# Patient Record
Sex: MALE | Race: BLACK OR AFRICAN AMERICAN | NOT HISPANIC OR LATINO | URBAN - METROPOLITAN AREA
[De-identification: names, ages, dates, MRNs, and addresses within clinical notes are randomized per-mention and may not be internally consistent; named-entity substitution may affect disease eponyms.]

---

## 2019-07-19 ENCOUNTER — EMERGENCY (EMERGENCY)
Facility: HOSPITAL | Age: 19
LOS: 1 days | Discharge: HOME | End: 2019-07-19
Attending: EMERGENCY MEDICINE
Payer: COMMERCIAL

## 2019-07-19 VITALS
WEIGHT: 220.02 LBS | OXYGEN SATURATION: 100 % | HEIGHT: 67 IN | TEMPERATURE: 98 F | DIASTOLIC BLOOD PRESSURE: 78 MMHG | RESPIRATION RATE: 16 BRPM | HEART RATE: 66 BPM | SYSTOLIC BLOOD PRESSURE: 119 MMHG

## 2019-07-19 VITALS
RESPIRATION RATE: 16 BRPM | DIASTOLIC BLOOD PRESSURE: 70 MMHG | TEMPERATURE: 99 F | OXYGEN SATURATION: 100 % | HEART RATE: 70 BPM | SYSTOLIC BLOOD PRESSURE: 120 MMHG

## 2019-07-19 PROCEDURE — 99283 EMERGENCY DEPT VISIT LOW MDM: CPT

## 2019-07-19 PROCEDURE — 99053 MED SERV 10PM-8AM 24 HR FAC: CPT

## 2019-07-19 RX ORDER — IBUPROFEN 200 MG
600 TABLET ORAL ONCE
Refills: 0 | Status: COMPLETED | OUTPATIENT
Start: 2019-07-19 | End: 2019-07-19

## 2019-07-19 RX ORDER — DIAZEPAM 5 MG
5 TABLET ORAL ONCE
Refills: 0 | Status: DISCONTINUED | OUTPATIENT
Start: 2019-07-19 | End: 2019-07-19

## 2019-07-19 RX ADMIN — Medication 600 MILLIGRAM(S): at 02:13

## 2019-07-19 RX ADMIN — Medication 5 MILLIGRAM(S): at 02:14

## 2019-07-19 RX ADMIN — Medication 600 MILLIGRAM(S): at 03:38
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
## 2019-07-19 NOTE — ED PROVIDER NOTE - PHYSICAL EXAMINATION
MSK: no midline or paraspinal tenderness to palpation  positive tenderness to palpation over anterior lateral neck, no edema/hematoma/abrasions  RUE strength 5/5 with grossly nml sensation  pt ambulatory with nml gait in ED

## 2020-07-25 NOTE — ED PROVIDER NOTE - RESPIRATORY, MLM
Physician Discharge Summary     Patient ID:  Efrain Persaud  [id number]  55 year old  1964    Admit date: 7/22/2020    Discharge date and time:  7/25/2020    Admitting Physician:  Josi Copeland MD     Discharge Physician:  Ann-Marie Barbosa MD     Admission Diagnoses: Pneumonia [J18.9]  Pneumonia of left lower lobe due to infectious organism [J18.9]    Discharge Diagnoses:   Left lung pneumonia - community acquired  Recent dx acute pyelonephritis/UTI - acute cystitis present on admission, being treated  Fever - resolved  Sepsis criteria met on admission, NOT severe sepsis per flowsheet tracker  Essential HTN  DM type II on insulin  Obstructive sleep apnea - on CPAP  CAD, history stents  Morbid obesity (BMI 41)  Hypokalemia  - improved  Hyponatremia, present on admission (resolved)  Lactic acidosis, present on admission - resolved    Admission Condition: fair    Discharged Condition: stable    Indication for Admission:  55-year-old male who presented to the ED with shortness of breath, chest tightness and weakness.  He has medical history pertinent for diabetes mellitus, hypertension, hyperlipidemia, and obesity with BMI 40.7.  He was seen in the ED yesterday 7/21 with 1 day of diarrhea, dizziness, and weakness.  He did not have any respiratory symptoms at that time. He had lower abdominal tenderness and was diagnosed with acute cystitis/pyelonephritis.  He was given IV fluids and sent home with Keflex.  Urine culture is now showing gram-negative rods.  COVID-19 swab was negative.  Symptoms continued and now associated with nonradiating substernal chest pressure, shortness of breath, and weakness.  Symptoms are nonexertional.  Deep inspiration worsens chest pressure.  He also reported mild dizziness, mild headache, nausea, and vomiting which has since resolved. He has not identified any other aggravating or alleviating factors.  He took Keflex but has not noticed a change in symptoms.  He came to the ED for  further evaluation.  While in the ED he was febrile and had a new left lower lobe infiltrate on chest x-ray.  Blood cultures were obtained.  COVID-19 swab was repeated.  He was transferred here due to bed capacity issues at [facility] and was admitted for further management.    Hospital Course:  He was admitted for IV Abx and IVF hydration. COVID test was negative. He has not needed oxygen. He continued to have intermittent fever over the first 24 hours, but smaller numbers, and now has had no fever in the past day. His urine culture is positive for citrobacter, blood cultures have remained negative. Abx are adjusted, he will be discharged on 2 oral abx to cover pulmonary and urinary infections.    Labs and vitals are good. electolyte abnormalities have resolved. Appetite has improved. He is ambulatory in the room and hallway. The dizziness has improved. No further isolation is needed.    Patient stable to discharge with close follow up     Consults: none    Significant Diagnostic Studies:     Lab Results   Component Value Date    SODIUM 140 07/25/2020    POTASSIUM 3.6 07/25/2020    CHLORIDE 104 07/25/2020    CO2 26 07/25/2020    GLUCOSE 138 (H) 07/25/2020    BUN 10 07/25/2020    CREATININE 0.70 07/25/2020    CALCIUM 9.1 07/25/2020    ALBUMIN 3.4 (L) 07/21/2020    MG 1.9 07/24/2020    BILIRUBIN 0.7 07/21/2020    ALKPT 80 07/21/2020    LACTA 1.2 07/22/2020    AST 29 07/21/2020    GPT 33 07/21/2020    LIPA 203 07/21/2020    INR 1.0 12/23/2019     Lab Results   Component Value Date    PTT 25 12/23/2019    WBC 5.4 07/24/2020    HGB 12.2 (L) 07/24/2020    HCT 36.6 (L) 07/24/2020     07/24/2020    RESR 14 11/07/2016    MMB 1.0 12/16/2013    MYOGLOBIN 48 04/07/2013    RAPDTR <0.02 12/23/2019    CPK 64 12/18/2013    CRP <0.3 11/07/2016    TSH 1.273 12/24/2019     Recent Labs   Lab 07/22/20  1703   NTPROB 150*       Urinalysis    Lab Results   Component Value Date    USPG >1.030 (H) 07/21/2020    UPROT Negative 07/21/2020     UWBC Small (A) 07/21/2020    URBC Negative 07/21/2020    UBILI Negative 07/21/2020    UPH 5.0 07/21/2020    UROB 0.2 07/21/2020    UBACTR NONE SEEN 01/20/2020     Lactic acid  2.6 - 1.2    Iron Panel    Lab Results   Component Value Date    IRON 52 (L) 03/27/2016    PST 20 03/27/2016     Cultures: Procalc  0.26             COVID negative 7/21, negative 7/22             Urine 7/21 - citrobacter koseri             Blood 7/22 - NGTD             Urine legionella/strep negative     Chest X-Ray:  Left retrocardiac opacity silhouetting out the left hemidiaphragm.  CT Abd/Pel 7/21 - 1. Small region of focal cortical hypoenhancement in the lateral lower pole of the left kidney, with mild adjacent perinephric inflammation. Findings raise suspicion for pyelonephritis. Alternatively, renal contusion could have similar appearance. Clinically correlate.  2. Hepatic steatosis.  3. Small bilateral adrenal myelolipomas.    Treatments: IV hydration, antibiotics: Zosyn, ceftriaxone and azithromycin, analgesia: acetaminophen, cardiac meds: metoprolol, furosemide and doxazosin, hydrochlorothiazide, isosorbide, losartan, anticoagulation: ASA, Plavix and LMW heparin and insulin: regular and Lantus    Discharge Exam:  Visit Vitals  BP (!) 154/72 (BP Location: LUE - Left upper extremity, Patient Position: Semi-Scott's)   Pulse 73   Temp 96.6 °F (35.9 °C) (Oral)   Resp 20   Ht 5' 8\" (1.727 m)   Wt 123 kg   SpO2 97%   BMI 41.23 kg/m²     Heart: Regular rate and rhythm  Lungs: Clear to auscultation bilaterally, better air movement to bases. No wheezing/rales  Abdomen: Soft, non-tender; bowel sounds normal; no masses, no hepatosplenomegaly  Skin: warm and dry, good color, no overt rashes or open areas  Neurologic: Grossly normal and Alert and oriented to person, place and time  Extremities: extremities normal, atraumatic, no cyanosis or edema and pulses are good    Disposition: Home    Patient Instructions:   Activity: activity as  tolerated  Diet: regular diet and encourage fluids  Wound Care: none needed    Follow-up with PCP in 1 week       Breath sounds clear and equal bilaterally.

## 2021-12-07 ENCOUNTER — INPATIENT (INPATIENT)
Facility: HOSPITAL | Age: 21
LOS: 3 days | Discharge: HOME | DRG: 871 | End: 2021-12-11
Attending: INTERNAL MEDICINE | Admitting: INTERNAL MEDICINE
Payer: COMMERCIAL

## 2021-12-07 VITALS
HEART RATE: 110 BPM | WEIGHT: 214.95 LBS | OXYGEN SATURATION: 90 % | TEMPERATURE: 103 F | HEIGHT: 67 IN | SYSTOLIC BLOOD PRESSURE: 124 MMHG | DIASTOLIC BLOOD PRESSURE: 80 MMHG | RESPIRATION RATE: 20 BRPM

## 2021-12-07 DIAGNOSIS — U07.1 COVID-19: ICD-10-CM

## 2021-12-07 DIAGNOSIS — Z29.9 ENCOUNTER FOR PROPHYLACTIC MEASURES, UNSPECIFIED: ICD-10-CM

## 2021-12-07 DIAGNOSIS — M62.82 RHABDOMYOLYSIS: ICD-10-CM

## 2021-12-07 DIAGNOSIS — R74.01 ELEVATION OF LEVELS OF LIVER TRANSAMINASE LEVELS: ICD-10-CM

## 2021-12-07 DIAGNOSIS — J96.01 ACUTE RESPIRATORY FAILURE WITH HYPOXIA: ICD-10-CM

## 2021-12-07 PROBLEM — Z78.9 OTHER SPECIFIED HEALTH STATUS: Chronic | Status: ACTIVE | Noted: 2019-07-19

## 2021-12-07 LAB
ALBUMIN SERPL ELPH-MCNC: 2.9 G/DL — LOW (ref 3.5–5)
ALP SERPL-CCNC: 43 U/L — SIGNIFICANT CHANGE UP (ref 40–120)
ALT FLD-CCNC: 302 U/L DA — HIGH (ref 10–60)
ANION GAP SERPL CALC-SCNC: 6 MMOL/L — SIGNIFICANT CHANGE UP (ref 5–17)
ANISOCYTOSIS BLD QL: SLIGHT — SIGNIFICANT CHANGE UP
AST SERPL-CCNC: 492 U/L — HIGH (ref 10–40)
BASOPHILS # BLD AUTO: 0 K/UL — SIGNIFICANT CHANGE UP (ref 0–0.2)
BASOPHILS NFR BLD AUTO: 0 % — SIGNIFICANT CHANGE UP (ref 0–2)
BILIRUB SERPL-MCNC: 1.1 MG/DL — SIGNIFICANT CHANGE UP (ref 0.2–1.2)
BUN SERPL-MCNC: 12 MG/DL — SIGNIFICANT CHANGE UP (ref 7–18)
CALCIUM SERPL-MCNC: 8.3 MG/DL — LOW (ref 8.4–10.5)
CHLORIDE SERPL-SCNC: 97 MMOL/L — SIGNIFICANT CHANGE UP (ref 96–108)
CK SERPL-CCNC: CRITICAL HIGH U/L (ref 35–232)
CO2 SERPL-SCNC: 30 MMOL/L — SIGNIFICANT CHANGE UP (ref 22–31)
CREAT SERPL-MCNC: 1.2 MG/DL — SIGNIFICANT CHANGE UP (ref 0.5–1.3)
CRP SERPL-MCNC: 104 MG/L — HIGH
D DIMER BLD IA.RAPID-MCNC: 998 NG/ML DDU — HIGH
EOSINOPHIL # BLD AUTO: 0 K/UL — SIGNIFICANT CHANGE UP (ref 0–0.5)
EOSINOPHIL NFR BLD AUTO: 0 % — SIGNIFICANT CHANGE UP (ref 0–6)
FERRITIN SERPL-MCNC: 6253 NG/ML — HIGH (ref 30–400)
GLUCOSE SERPL-MCNC: 97 MG/DL — SIGNIFICANT CHANGE UP (ref 70–99)
HCT VFR BLD CALC: 41.4 % — SIGNIFICANT CHANGE UP (ref 39–50)
HGB BLD-MCNC: 13.7 G/DL — SIGNIFICANT CHANGE UP (ref 13–17)
INR BLD: 1.2 RATIO — HIGH (ref 0.88–1.16)
LACTATE SERPL-SCNC: 1.1 MMOL/L — SIGNIFICANT CHANGE UP (ref 0.7–2)
LYMPHOCYTES # BLD AUTO: 0.63 K/UL — LOW (ref 1–3.3)
LYMPHOCYTES # BLD AUTO: 17 % — SIGNIFICANT CHANGE UP (ref 13–44)
MANUAL SMEAR VERIFICATION: SIGNIFICANT CHANGE UP
MCHC RBC-ENTMCNC: 23.5 PG — LOW (ref 27–34)
MCHC RBC-ENTMCNC: 33.1 GM/DL — SIGNIFICANT CHANGE UP (ref 32–36)
MCV RBC AUTO: 70.9 FL — LOW (ref 80–100)
MONOCYTES # BLD AUTO: 0.11 K/UL — SIGNIFICANT CHANGE UP (ref 0–0.9)
MONOCYTES NFR BLD AUTO: 3 % — SIGNIFICANT CHANGE UP (ref 2–14)
NEUTROPHILS # BLD AUTO: 2.98 K/UL — SIGNIFICANT CHANGE UP (ref 1.8–7.4)
NEUTROPHILS NFR BLD AUTO: 80 % — HIGH (ref 43–77)
NRBC # BLD: 0 /100 — SIGNIFICANT CHANGE UP (ref 0–0)
PLAT MORPH BLD: NORMAL — SIGNIFICANT CHANGE UP
PLATELET # BLD AUTO: 233 K/UL — SIGNIFICANT CHANGE UP (ref 150–400)
POIKILOCYTOSIS BLD QL AUTO: SLIGHT — SIGNIFICANT CHANGE UP
POLYCHROMASIA BLD QL SMEAR: SLIGHT — SIGNIFICANT CHANGE UP
POTASSIUM SERPL-MCNC: 3.5 MMOL/L — SIGNIFICANT CHANGE UP (ref 3.5–5.3)
POTASSIUM SERPL-SCNC: 3.5 MMOL/L — SIGNIFICANT CHANGE UP (ref 3.5–5.3)
PROCALCITONIN SERPL-MCNC: 0.41 NG/ML — HIGH (ref 0.02–0.1)
PROT SERPL-MCNC: 7.2 G/DL — SIGNIFICANT CHANGE UP (ref 6–8.3)
PROTHROM AB SERPL-ACNC: 14.2 SEC — HIGH (ref 10.6–13.6)
RBC # BLD: 5.84 M/UL — HIGH (ref 4.2–5.8)
RBC # FLD: 13.2 % — SIGNIFICANT CHANGE UP (ref 10.3–14.5)
RBC BLD AUTO: ABNORMAL
SARS-COV-2 RNA SPEC QL NAA+PROBE: DETECTED
SODIUM SERPL-SCNC: 133 MMOL/L — LOW (ref 135–145)
TROPONIN I, HIGH SENSITIVITY RESULT: 41.2 NG/L — SIGNIFICANT CHANGE UP
WBC # BLD: 3.73 K/UL — LOW (ref 3.8–10.5)
WBC # FLD AUTO: 3.73 K/UL — LOW (ref 3.8–10.5)

## 2021-12-07 PROCEDURE — 93010 ELECTROCARDIOGRAM REPORT: CPT

## 2021-12-07 PROCEDURE — 99291 CRITICAL CARE FIRST HOUR: CPT

## 2021-12-07 PROCEDURE — 71045 X-RAY EXAM CHEST 1 VIEW: CPT | Mod: 26

## 2021-12-07 RX ORDER — REMDESIVIR 5 MG/ML
200 INJECTION INTRAVENOUS EVERY 24 HOURS
Refills: 0 | Status: COMPLETED | OUTPATIENT
Start: 2021-12-07 | End: 2021-12-07

## 2021-12-07 RX ORDER — ACETAMINOPHEN 500 MG
325 TABLET ORAL EVERY 4 HOURS
Refills: 0 | Status: DISCONTINUED | OUTPATIENT
Start: 2021-12-07 | End: 2021-12-11

## 2021-12-07 RX ORDER — DEXAMETHASONE 0.5 MG/5ML
10 ELIXIR ORAL ONCE
Refills: 0 | Status: DISCONTINUED | OUTPATIENT
Start: 2021-12-07 | End: 2021-12-07

## 2021-12-07 RX ORDER — PANTOPRAZOLE SODIUM 20 MG/1
40 TABLET, DELAYED RELEASE ORAL
Refills: 0 | Status: DISCONTINUED | OUTPATIENT
Start: 2021-12-07 | End: 2021-12-08

## 2021-12-07 RX ORDER — DEXAMETHASONE 0.5 MG/5ML
6 ELIXIR ORAL DAILY
Refills: 0 | Status: DISCONTINUED | OUTPATIENT
Start: 2021-12-07 | End: 2021-12-11

## 2021-12-07 RX ORDER — ONDANSETRON 8 MG/1
4 TABLET, FILM COATED ORAL ONCE
Refills: 0 | Status: COMPLETED | OUTPATIENT
Start: 2021-12-07 | End: 2021-12-07

## 2021-12-07 RX ORDER — SODIUM CHLORIDE 9 MG/ML
1000 INJECTION, SOLUTION INTRAVENOUS
Refills: 0 | Status: DISCONTINUED | OUTPATIENT
Start: 2021-12-07 | End: 2021-12-10

## 2021-12-07 RX ORDER — DEXAMETHASONE 0.5 MG/5ML
10 ELIXIR ORAL ONCE
Refills: 0 | Status: COMPLETED | OUTPATIENT
Start: 2021-12-07 | End: 2021-12-07

## 2021-12-07 RX ORDER — SODIUM CHLORIDE 9 MG/ML
1000 INJECTION INTRAMUSCULAR; INTRAVENOUS; SUBCUTANEOUS ONCE
Refills: 0 | Status: COMPLETED | OUTPATIENT
Start: 2021-12-07 | End: 2021-12-07

## 2021-12-07 RX ORDER — REMDESIVIR 5 MG/ML
INJECTION INTRAVENOUS
Refills: 0 | Status: COMPLETED | OUTPATIENT
Start: 2021-12-07 | End: 2021-12-11

## 2021-12-07 RX ORDER — KETOROLAC TROMETHAMINE 30 MG/ML
15 SYRINGE (ML) INJECTION ONCE
Refills: 0 | Status: DISCONTINUED | OUTPATIENT
Start: 2021-12-07 | End: 2021-12-07

## 2021-12-07 RX ORDER — ENOXAPARIN SODIUM 100 MG/ML
40 INJECTION SUBCUTANEOUS EVERY 12 HOURS
Refills: 0 | Status: DISCONTINUED | OUTPATIENT
Start: 2021-12-07 | End: 2021-12-11

## 2021-12-07 RX ORDER — REMDESIVIR 5 MG/ML
100 INJECTION INTRAVENOUS EVERY 24 HOURS
Refills: 0 | Status: COMPLETED | OUTPATIENT
Start: 2021-12-08 | End: 2021-12-11

## 2021-12-07 RX ADMIN — Medication 325 MILLIGRAM(S): at 21:29

## 2021-12-07 RX ADMIN — SODIUM CHLORIDE 1000 MILLILITER(S): 9 INJECTION INTRAMUSCULAR; INTRAVENOUS; SUBCUTANEOUS at 13:00

## 2021-12-07 RX ADMIN — Medication 102 MILLIGRAM(S): at 13:15

## 2021-12-07 RX ADMIN — Medication 15 MILLIGRAM(S): at 14:17

## 2021-12-07 RX ADMIN — SODIUM CHLORIDE 1000 MILLILITER(S): 9 INJECTION INTRAMUSCULAR; INTRAVENOUS; SUBCUTANEOUS at 11:02

## 2021-12-07 RX ADMIN — Medication 15 MILLIGRAM(S): at 11:02

## 2021-12-07 RX ADMIN — REMDESIVIR 500 MILLIGRAM(S): 5 INJECTION INTRAVENOUS at 14:40

## 2021-12-07 RX ADMIN — Medication 325 MILLIGRAM(S): at 21:28

## 2021-12-07 RX ADMIN — SODIUM CHLORIDE 100 MILLILITER(S): 9 INJECTION, SOLUTION INTRAVENOUS at 19:44

## 2021-12-07 RX ADMIN — ENOXAPARIN SODIUM 40 MILLIGRAM(S): 100 INJECTION SUBCUTANEOUS at 19:28

## 2021-12-07 NOTE — H&P ADULT - HISTORY OF PRESENT ILLNESS
20 yo M no known PMHx came with shortness of breadth for 1 day. Pt stated that he was diagnosed with COVID on Nov 27 and was quarantine himself. He was having fevers, cough, N/V, weakness while he was in quarantine but last night he was short of breadth, went to urgent care and was found to be hypoxic and sent to ED. Pt stated that he is not vaccinated , didn't get it because it is new vaccine. No one in his family vaccinated. He was exposed to his sister and friend who turned +ve before him. Pt denied any chest pain, diarrhea, loss of sense of smell or taste.     In Ed: Pt was desaturating to high 80s started on nasal cannula  2lit. D dimer 998, CK >14k AST//302

## 2021-12-07 NOTE — H&P ADULT - NSHPPHYSICALEXAM_GEN_ALL_CORE
Vital Signs Last 24 Hrs  T(C): 39.3 (07 Dec 2021 09:55), Max: 39.3 (07 Dec 2021 09:55)  T(F): 102.7 (07 Dec 2021 09:55), Max: 102.7 (07 Dec 2021 09:55)  HR: 110 (07 Dec 2021 09:55) (110 - 110)  BP: 124/80 (07 Dec 2021 09:55) (124/80 - 124/80)  BP(mean): --  RR: 20 (07 Dec 2021 09:55) (20 - 20)  SpO2: 90% (07 Dec 2021 09:55) (90% - 90%)    GENERAL: NAD, on 2  lit oxygen nc   EYES: EOMI, PERRLA,   NECK: Supple, No JVD  CHEST/LUNG: Clear to auscultation b/l  No rales, rhonchi, wheezing   HEART: Regular rate and rhythm; No murmurs, +ve S1 S2   ABDOMEN: Soft, Nontender, Nondistended; Bowel sounds present  NERVOUS SYSTEM:  Alert & Oriented X3, normal sensations and normal strength     EXTREMITIES:   No clubbing, cyanosis, or edema

## 2021-12-07 NOTE — ED PROVIDER NOTE - CLINICAL SUMMARY MEDICAL DECISION MAKING FREE TEXT BOX
21 yr old male with no hx presents to ed c/o sob, myalgia, fatigue, chest congestion, diarrhea, dry cough since 11/28/21 +covid 11/29/21. no leg swelling. not vaccinated, no sick contact, no smoking    covid hypoxia sx- labs, decadron, toradol, cxr, possibly admit

## 2021-12-07 NOTE — PATIENT PROFILE ADULT - FALL HARM RISK - HARM RISK INTERVENTIONS
Assistance with ambulation/Assistance OOB with selected safe patient handling equipment/Communicate Risk of Fall with Harm to all staff/Monitor gait and stability/Reinforce activity limits and safety measures with patient and family/Sit up slowly, dangle for a short time, stand at bedside before walking/Tailored Fall Risk Interventions/Visual Cue: Yellow wristband and red socks/Bed in lowest position, wheels locked, appropriate side rails in place/Call bell, personal items and telephone in reach/Instruct patient to call for assistance before getting out of bed or chair/Non-slip footwear when patient is out of bed/[place] to call system/Physically safe environment - no spills, clutter or unnecessary equipment/Purposeful Proactive Rounding/Room/bathroom lighting operational, light cord in reach

## 2021-12-07 NOTE — H&P ADULT - PROBLEM SELECTOR PLAN 1
c/w shortness of breadth  COVID +ve since Nov 27  Desat into high 80s, on 2 lit NC  D Dimer 998  Will start on remdesivir   Will start on decadron   Will sent covid biomarkers and antibodies   Monitor respiratory status   Lovenox 40mg bid BMI >30

## 2021-12-07 NOTE — H&P ADULT - PROBLEM SELECTOR PLAN 3
AST//302  Will start on remdesivir   Monitor LFTs  Discontinue remdesivir if ALT elevation is accompanied by signs or symptoms of liver inflammation or increasing conjugated bilirubin, alkaline phosphatase, or PT.  Discontinue Remdesivir if ALT is greater than or equal to 10 times the ULN, can consider restarting once ALT is less than 10 times the ULN.

## 2021-12-07 NOTE — H&P ADULT - ATTENDING COMMENTS
20 yo M no known PMHx came with shortness of breadth for 1 day. Pt stated that he was diagnosed with COVID on Nov 27 and was quarantine himself. He was having fevers, cough, N/V, weakness while he was in quarantine but last night he was short of breadth, went to urgent care and was found to be hypoxic and sent to ED. Pt stated that he is not vaccinated , didn't get it because it is new vaccine. No one in his family vaccinated. He was exposed to his sister and friend who turned +ve before him. Pt denied any chest pain, diarrhea, loss of sense of smell or taste.     In Ed: Pt was desaturating to high 80s started on nasal cannula  2lit. D dimer 998, CK >14k AST//302         assessment  --  sepsis, pneumonia 2nd to covid 19, rhabdomyolysis, transaminitis, unvaccinated     plan  --  admit to med, decadron, remdesevir, vit c, vitamin d, zinc, pepcid, singulair, contact and airborne isolation, cont albuterol inhaler, cont supportive care with tylenol prn, robitussin prn, lovenox, and O2 via nasal canula, ivf       covid-19 antibody test, procalcitonin, D-dimer, crp, ldh, ferritin, lactate, cbc, bmp, mg, phos, lipids, tsh, bld cx, ua, ucx      pulm cons

## 2021-12-07 NOTE — H&P ADULT - PROBLEM SELECTOR PLAN 4
IMPROVE VTE score: 2  Will manage with: Lovenox S/C     [ ] Previous VTE                                    3  [ ] Thrombophilia                                  2  [ ] Lower limb paralysis                        2  (unable to hold up >15 seconds)    [ ] Current Cancer (within 6 months)        2   [x] Immobilization > 24 hrs                    1  [ ] ICU/CCU stay > 24 hrs                      1  [x] Age > 60                                         1

## 2021-12-07 NOTE — ED PROVIDER NOTE - OBJECTIVE STATEMENT
21 yr old male with no hx presents to ed c/o sob, myalgia, fatigue, chest congestion, diarrhea, dry cough since 11/28/21 +covid 11/29/21. no leg swelling. not vaccinated, no sick contact, no smoking

## 2021-12-07 NOTE — ED ADULT NURSE NOTE - OBJECTIVE STATEMENT
patient sent from urgent care. last night fever 105, O2 low at O2 with cxr showing pna. fever/body aches/chills/vomiting since 11/28 covid pos 11/29

## 2021-12-07 NOTE — ED PROVIDER NOTE - ADMIT DISPOSITION PRESENT ON ADMISSION SEPSIS
Patient is currently out of the state in [place] and the medication was sent out of courtesy to the patient.  If not covered by his insurance then will need to establish with a doctor while he is there.  Unable to do telemedicine visit since he is out of the [facility]   No

## 2021-12-08 DIAGNOSIS — U07.1 COVID-19: ICD-10-CM

## 2021-12-08 LAB
A1C WITH ESTIMATED AVERAGE GLUCOSE RESULT: 6.2 % — HIGH (ref 4–5.6)
ALBUMIN SERPL ELPH-MCNC: 2.6 G/DL — LOW (ref 3.5–5)
ALP SERPL-CCNC: 39 U/L — LOW (ref 40–120)
ALT FLD-CCNC: 278 U/L DA — HIGH (ref 10–60)
ANION GAP SERPL CALC-SCNC: 7 MMOL/L — SIGNIFICANT CHANGE UP (ref 5–17)
AST SERPL-CCNC: 393 U/L — HIGH (ref 10–40)
BILIRUB SERPL-MCNC: 0.7 MG/DL — SIGNIFICANT CHANGE UP (ref 0.2–1.2)
BUN SERPL-MCNC: 13 MG/DL — SIGNIFICANT CHANGE UP (ref 7–18)
CALCIUM SERPL-MCNC: 8.5 MG/DL — SIGNIFICANT CHANGE UP (ref 8.4–10.5)
CHLORIDE SERPL-SCNC: 104 MMOL/L — SIGNIFICANT CHANGE UP (ref 96–108)
CHOLEST SERPL-MCNC: 147 MG/DL — SIGNIFICANT CHANGE UP
CK SERPL-CCNC: CRITICAL HIGH U/L (ref 35–232)
CO2 SERPL-SCNC: 26 MMOL/L — SIGNIFICANT CHANGE UP (ref 22–31)
COVID-19 NUCLEOCAPSID GAM AB INTERP: POSITIVE
COVID-19 NUCLEOCAPSID TOTAL GAM ANTIBODY RESULT: 3.12 INDEX — HIGH
COVID-19 SPIKE DOMAIN AB INTERP: POSITIVE
COVID-19 SPIKE DOMAIN ANTIBODY RESULT: 2.96 U/ML — HIGH
CREAT SERPL-MCNC: 0.9 MG/DL — SIGNIFICANT CHANGE UP (ref 0.5–1.3)
CRP SERPL-MCNC: 108 MG/L — HIGH
D DIMER BLD IA.RAPID-MCNC: 1263 NG/ML DDU — HIGH
ERYTHROCYTE [SEDIMENTATION RATE] IN BLOOD: 25 MM/HR — HIGH (ref 0–15)
ESTIMATED AVERAGE GLUCOSE: 131 MG/DL — HIGH (ref 68–114)
GLUCOSE SERPL-MCNC: 120 MG/DL — HIGH (ref 70–99)
HCT VFR BLD CALC: 42.7 % — SIGNIFICANT CHANGE UP (ref 39–50)
HDLC SERPL-MCNC: 30 MG/DL — LOW
HGB BLD-MCNC: 13.8 G/DL — SIGNIFICANT CHANGE UP (ref 13–17)
LDH SERPL L TO P-CCNC: 1390 U/L — HIGH (ref 120–225)
LIPID PNL WITH DIRECT LDL SERPL: 90 MG/DL — SIGNIFICANT CHANGE UP
MAGNESIUM SERPL-MCNC: 2.3 MG/DL — SIGNIFICANT CHANGE UP (ref 1.6–2.6)
MCHC RBC-ENTMCNC: 23.2 PG — LOW (ref 27–34)
MCHC RBC-ENTMCNC: 32.3 GM/DL — SIGNIFICANT CHANGE UP (ref 32–36)
MCV RBC AUTO: 71.8 FL — LOW (ref 80–100)
NON HDL CHOLESTEROL: 117 MG/DL — SIGNIFICANT CHANGE UP
NRBC # BLD: 0 /100 WBCS — SIGNIFICANT CHANGE UP (ref 0–0)
PHOSPHATE SERPL-MCNC: 3.9 MG/DL — SIGNIFICANT CHANGE UP (ref 2.5–4.5)
PLATELET # BLD AUTO: 256 K/UL — SIGNIFICANT CHANGE UP (ref 150–400)
POTASSIUM SERPL-MCNC: 3.9 MMOL/L — SIGNIFICANT CHANGE UP (ref 3.5–5.3)
POTASSIUM SERPL-SCNC: 3.9 MMOL/L — SIGNIFICANT CHANGE UP (ref 3.5–5.3)
PROT SERPL-MCNC: 7 G/DL — SIGNIFICANT CHANGE UP (ref 6–8.3)
RBC # BLD: 5.95 M/UL — HIGH (ref 4.2–5.8)
RBC # FLD: 13.7 % — SIGNIFICANT CHANGE UP (ref 10.3–14.5)
SARS-COV-2 IGG+IGM SERPL QL IA: 2.96 U/ML — HIGH
SARS-COV-2 IGG+IGM SERPL QL IA: 3.12 INDEX — HIGH
SARS-COV-2 IGG+IGM SERPL QL IA: POSITIVE
SARS-COV-2 IGG+IGM SERPL QL IA: POSITIVE
SODIUM SERPL-SCNC: 137 MMOL/L — SIGNIFICANT CHANGE UP (ref 135–145)
TRIGL SERPL-MCNC: 133 MG/DL — SIGNIFICANT CHANGE UP
WBC # BLD: 3.34 K/UL — LOW (ref 3.8–10.5)
WBC # FLD AUTO: 3.34 K/UL — LOW (ref 3.8–10.5)

## 2021-12-08 RX ORDER — ZINC SULFATE TAB 220 MG (50 MG ZINC EQUIVALENT) 220 (50 ZN) MG
220 TAB ORAL DAILY
Refills: 0 | Status: DISCONTINUED | OUTPATIENT
Start: 2021-12-08 | End: 2021-12-11

## 2021-12-08 RX ORDER — ASCORBIC ACID 60 MG
500 TABLET,CHEWABLE ORAL DAILY
Refills: 0 | Status: DISCONTINUED | OUTPATIENT
Start: 2021-12-08 | End: 2021-12-11

## 2021-12-08 RX ORDER — SODIUM CHLORIDE 9 MG/ML
1000 INJECTION INTRAMUSCULAR; INTRAVENOUS; SUBCUTANEOUS ONCE
Refills: 0 | Status: COMPLETED | OUTPATIENT
Start: 2021-12-08 | End: 2021-12-08

## 2021-12-08 RX ORDER — MONTELUKAST 4 MG/1
10 TABLET, CHEWABLE ORAL AT BEDTIME
Refills: 0 | Status: DISCONTINUED | OUTPATIENT
Start: 2021-12-08 | End: 2021-12-11

## 2021-12-08 RX ORDER — SODIUM CHLORIDE 9 MG/ML
1000 INJECTION INTRAMUSCULAR; INTRAVENOUS; SUBCUTANEOUS
Refills: 0 | Status: DISCONTINUED | OUTPATIENT
Start: 2021-12-08 | End: 2021-12-10

## 2021-12-08 RX ORDER — FAMOTIDINE 10 MG/ML
20 INJECTION INTRAVENOUS DAILY
Refills: 0 | Status: DISCONTINUED | OUTPATIENT
Start: 2021-12-08 | End: 2021-12-11

## 2021-12-08 RX ORDER — FLUTICASONE PROPIONATE 50 MCG
1 SPRAY, SUSPENSION NASAL
Refills: 0 | Status: DISCONTINUED | OUTPATIENT
Start: 2021-12-08 | End: 2021-12-11

## 2021-12-08 RX ORDER — CHOLECALCIFEROL (VITAMIN D3) 125 MCG
2000 CAPSULE ORAL DAILY
Refills: 0 | Status: DISCONTINUED | OUTPATIENT
Start: 2021-12-08 | End: 2021-12-11

## 2021-12-08 RX ADMIN — REMDESIVIR 500 MILLIGRAM(S): 5 INJECTION INTRAVENOUS at 13:56

## 2021-12-08 RX ADMIN — SODIUM CHLORIDE 125 MILLILITER(S): 9 INJECTION INTRAMUSCULAR; INTRAVENOUS; SUBCUTANEOUS at 21:20

## 2021-12-08 RX ADMIN — Medication 325 MILLIGRAM(S): at 09:30

## 2021-12-08 RX ADMIN — ENOXAPARIN SODIUM 40 MILLIGRAM(S): 100 INJECTION SUBCUTANEOUS at 06:14

## 2021-12-08 RX ADMIN — PANTOPRAZOLE SODIUM 40 MILLIGRAM(S): 20 TABLET, DELAYED RELEASE ORAL at 06:14

## 2021-12-08 RX ADMIN — SODIUM CHLORIDE 1000 MILLILITER(S): 9 INJECTION INTRAMUSCULAR; INTRAVENOUS; SUBCUTANEOUS at 19:02

## 2021-12-08 RX ADMIN — SODIUM CHLORIDE 100 MILLILITER(S): 9 INJECTION, SOLUTION INTRAVENOUS at 06:14

## 2021-12-08 RX ADMIN — ENOXAPARIN SODIUM 40 MILLIGRAM(S): 100 INJECTION SUBCUTANEOUS at 17:50

## 2021-12-08 RX ADMIN — Medication 1 SPRAY(S): at 17:50

## 2021-12-08 RX ADMIN — Medication 6 MILLIGRAM(S): at 06:13

## 2021-12-08 RX ADMIN — Medication 325 MILLIGRAM(S): at 08:34

## 2021-12-08 RX ADMIN — MONTELUKAST 10 MILLIGRAM(S): 4 TABLET, CHEWABLE ORAL at 21:21

## 2021-12-08 NOTE — PROGRESS NOTE ADULT - ASSESSMENT
20 yo M no known PMHx came with shortness of breadth for 1 day. Admitted for covid pneumonia  20 yo M no known PMHx presents with SOB +cough, found COVID +. Endorses being unvaccinated, first positve test was 11/29 at [facility]. Admitted to medicine for acute hypoxic resp fail 2/2 to COVID, requiring supplemental 02. Started on remdesivir and Decadron today is D2, plan wean off 02 as tolerated.

## 2021-12-08 NOTE — PROGRESS NOTE ADULT - PROBLEM SELECTOR PLAN 2
CK >15k  s/p 2 lit bolus in ED  Will start on maintenance fluid 100cc/hr for 24 hr  Serial CK CK >15k  s/p 2 lit bolus + maintenance fluid 100cc/hr for 24 hr  trend CK

## 2021-12-08 NOTE — PROGRESS NOTE ADULT - PROBLEM SELECTOR PLAN 3
AST//302  Will start on remdesivir   Monitor LFTs  Discontinue remdesivir if ALT elevation is accompanied by signs or symptoms of liver inflammation or increasing conjugated bilirubin, alkaline phosphatase, or PT.  Discontinue Remdesivir if ALT is greater than or equal to 10 times the ULN, can consider restarting once ALT is less than 10 times the ULN. Downtrending   trend LFTs   avoid hepatotoxins   Discontinue remdesivir if ALT elevation is accompanied by signs or symptoms of liver inflammation or increasing conjugated bilirubin, alkaline phosphatase, or PT.

## 2021-12-08 NOTE — PROGRESS NOTE ADULT - SUBJECTIVE AND OBJECTIVE BOX
Patient is a 21y old  Male who presents with a chief complaint of AHRF sec to COVID pneumonia (07 Dec 2021 13:59)    pt seen in icu [  ], reg med floor [   ], bed [  ], chair at bedside [   ], a+o x3 [  ], lethargic [  ],  nad [  ]    barrera [  ], ngt [  ], peg [  ], et tube [  ], cent line [  ], picc line [  ]        Allergies    No Known Allergies        Vitals    T(F): 98.7 (12-08-21 @ 05:13), Max: 103.4 (12-07-21 @ 16:01)  HR: 83 (12-08-21 @ 05:13) (83 - 110)  BP: 129/74 (12-08-21 @ 05:13) (123/76 - 136/92)  RR: 16 (12-08-21 @ 05:13) (16 - 20)  SpO2: 98% (12-08-21 @ 05:13) (90% - 98%)  Wt(kg): --  CAPILLARY BLOOD GLUCOSE          Labs                          13.7   3.73  )-----------( 233      ( 07 Dec 2021 11:11 )             41.4       12-07    133<L>  |  97  |  12  ----------------------------<  97  3.5   |  30  |  1.20    Ca    8.3<L>      07 Dec 2021 11:11    TPro  7.2  /  Alb  2.9<L>  /  TBili  1.1  /  DBili  x   /  AST  492<H>  /  ALT  302<H>  /  AlkPhos  43  12-07      CARDIAC MARKERS ( 07 Dec 2021 11:11 )  x     / x     / >85554 U/L / x     / x          Troponin I, High Sensitivity Result: 41.2 ng/L (12-07-21 @ 11:11)        Radiology Results      Meds    MEDICATIONS  (STANDING):  dexAMETHasone  Injectable 6 milliGRAM(s) IV Push daily  enoxaparin Injectable 40 milliGRAM(s) SubCutaneous every 12 hours  lactated ringers. 1000 milliLiter(s) (100 mL/Hr) IV Continuous <Continuous>  pantoprazole    Tablet 40 milliGRAM(s) Oral before breakfast  remdesivir  IVPB   IV Intermittent   remdesivir  IVPB 100 milliGRAM(s) IV Intermittent every 24 hours      MEDICATIONS  (PRN):  acetaminophen     Tablet .. 325 milliGRAM(s) Oral every 4 hours PRN Temp greater or equal to 38C (100.4F)      Physical Exam    Neuro :  no focal deficits  Respiratory: CTA B/L  CV: RRR, S1S2, no murmurs,   Abdominal: Soft, NT, ND +BS,  Extremities: No edema, + peripheral pulses    ASSESSMENT    Infection due to severe acute respiratory syndrome coronavirus 2 (SARS-CoV-2)    No pertinent past medical history        PLAN     Patient is a 21y old  Male who presents with a chief complaint of AHRF sec to COVID pneumonia (07 Dec 2021 13:59)    pt seen in icu [  ], reg med floor [ x  ], bed [ x ], chair at bedside [   ], a+o x3 [x  ], lethargic [  ],  nad [ x ]      Allergies    No Known Allergies        Vitals    T(F): 98.7 (12-08-21 @ 05:13), Max: 103.4 (12-07-21 @ 16:01)  HR: 83 (12-08-21 @ 05:13) (83 - 110)  BP: 129/74 (12-08-21 @ 05:13) (123/76 - 136/92)  RR: 16 (12-08-21 @ 05:13) (16 - 20)  SpO2: 98% (12-08-21 @ 05:13) (90% - 98%)  Wt(kg): --  CAPILLARY BLOOD GLUCOSE          Labs                          13.7   3.73  )-----------( 233      ( 07 Dec 2021 11:11 )             41.4       12-07    133<L>  |  97  |  12  ----------------------------<  97  3.5   |  30  |  1.20    Ca    8.3<L>      07 Dec 2021 11:11    TPro  7.2  /  Alb  2.9<L>  /  TBili  1.1  /  DBili  x   /  AST  492<H>  /  ALT  302<H>  /  AlkPhos  43  12-07      CARDIAC MARKERS ( 07 Dec 2021 11:11 )  x     / x     / >36211 U/L / x     / x          Troponin I, High Sensitivity Result: 41.2 ng/L (12-07-21 @ 11:11)    COVID-19 Nucleocapsid Antibody (12.08.21 @ 11:03)   COVID-19 Nucleocapsid JASON AB Interp: Positive:   Procalcitonin, Serum (12.07.21 @ 20:24)   Procalcitonin, Serum: 0.41   C-Reactive Protein, Serum (12.08.21 @ 11:05)   C-Reactive Protein, Serum: 108  D-Dimer Assay, Quantitative (12.08.21 @ 06:49)   D-Dimer Assay, Quantitative: 1263  Ferritin, Serum (12.07.21 @ 20:20)   Ferritin, Serum: 6253:  Lactate Dehydrogenase, Serum in AM (12.08.21 @ 06:49)   Lactate Dehydrogenase, Serum: 1390         Radiology Results      Meds    MEDICATIONS  (STANDING):  dexAMETHasone  Injectable 6 milliGRAM(s) IV Push daily  enoxaparin Injectable 40 milliGRAM(s) SubCutaneous every 12 hours  lactated ringers. 1000 milliLiter(s) (100 mL/Hr) IV Continuous <Continuous>  pantoprazole    Tablet 40 milliGRAM(s) Oral before breakfast  remdesivir  IVPB   IV Intermittent   remdesivir  IVPB 100 milliGRAM(s) IV Intermittent every 24 hours      MEDICATIONS  (PRN):  acetaminophen     Tablet .. 325 milliGRAM(s) Oral every 4 hours PRN Temp greater or equal to 38C (100.4F)      Physical Exam    Neuro :  no focal deficits  Respiratory: CTA B/L  CV: RRR, S1S2, no murmurs,   Abdominal: Soft, NT, ND +BS,  Extremities: No edema, + peripheral pulses      ASSESSMENT    sepsis,   pneumonia 2nd to covid 19,   rhabdomyolysis,   transaminitis,   unvaccinated         PLAN    cont decadron and remdesevir,   vit c, vitamin d, zinc, pepcid, singulair,   contact and airborne isolation,   pulm cons   cont albuterol inhaler,   fluticasone nasal spray bid   cont tylenol prn,   robitussin prn,   lovenox,  O2 via nasal canula,   covid-19 nucleocapsid antibody test positive noted above,   procalcitonin, D-dimer, crp, ldh, ferritin, noted above   cont dvt prophylaxis   bld cx, ua, ucx   cont ivf  f/u ck levs   cont current meds

## 2021-12-08 NOTE — CONSULT NOTE ADULT - SUBJECTIVE AND OBJECTIVE BOX
PULMONARY CONSULT NOTE      STACIE ELLIS  MRN-[id number]    History of Present Illness:  Reason for Admission: AHRF sec to COVID pneumonia  History of Present Illness:   22 yo M no known PMHx came with shortness of breadth for 1 day. Pt stated that he was diagnosed with COVID on Nov 27 and was quarantine himself. He was having fevers, cough, N/V, weakness while he was in quarantine but last night he was short of breadth, went to urgent care and was found to be hypoxic and sent to ED. Pt stated that he is not vaccinated , didn't get it because it is new vaccine. No one in his family vaccinated. He was exposed to his sister and friend who turned +ve before him. Pt denied any chest pain, diarrhea, loss of sense of smell or taste.     In Ed: Pt was desaturating to high 80s started on nasal cannula  2lit. D dimer 998, CK >14k AST//302        HISTORY OF PRESENT ILLNESS: As above. Awake, alert, comfortable in bed in NAD    MEDICATIONS  (STANDING):  dexAMETHasone  Injectable 6 milliGRAM(s) IV Push daily  enoxaparin Injectable 40 milliGRAM(s) SubCutaneous every 12 hours  lactated ringers. 1000 milliLiter(s) (100 mL/Hr) IV Continuous <Continuous>  pantoprazole    Tablet 40 milliGRAM(s) Oral before breakfast  remdesivir  IVPB   IV Intermittent   remdesivir  IVPB 100 milliGRAM(s) IV Intermittent every 24 hours      MEDICATIONS  (PRN):  acetaminophen     Tablet .. 325 milliGRAM(s) Oral every 4 hours PRN Temp greater or equal to 38C (100.4F)      Allergies    No Known Allergies    Intolerances        PAST MEDICAL & SURGICAL HISTORY:  No pertinent past medical history        FAMILY HISTORY:      SOCIAL HISTORY  Smoking History:     REVIEW OF SYSTEMS:    CONSTITUTIONAL:  No fevers, chills, sweats    HEENT:  Eyes:  No diplopia or blurred vision. ENT:  No earache, sore throat or runny nose.    CARDIOVASCULAR:  No pressure, squeezing, tightness, or heaviness about the chest; no palpitations.    RESPIRATORY:  Per HPI    GASTROINTESTINAL:  No abdominal pain, nausea, vomiting or diarrhea.    GENITOURINARY:  No dysuria, frequency or urgency.    NEUROLOGIC:  No paresthesias, fasciculations, seizures or weakness.    PSYCHIATRIC:  No disorder of thought or mood.    Vital Signs Last 24 Hrs  T(C): 38 (08 Dec 2021 08:19), Max: 39.7 (07 Dec 2021 16:01)  T(F): 100.4 (08 Dec 2021 08:19), Max: 103.4 (07 Dec 2021 16:01)  HR: 75 (08 Dec 2021 08:19) (75 - 108)  BP: 129/74 (08 Dec 2021 05:13) (123/76 - 136/92)  BP(mean): --  RR: 16 (08 Dec 2021 05:13) (16 - 20)  SpO2: 92% (08 Dec 2021 09:05) (81% - 98%)  I&O's Detail      PHYSICAL EXAMINATION:    GENERAL: The patient is a well-developed, well-nourished _____in no apparent distress.     HEENT: Head is normocephalic and atraumatic. Extraocular muscles are intact. Mucous membranes are moist.     NECK: Supple.     LUNGS: Clear to auscultation without wheezing, rales, or rhonchi. Respirations unlabored    HEART: Regular rate and rhythm without murmur.    ABDOMEN: Soft, nontender, and nondistended.  No hepatosplenomegaly is noted.    EXTREMITIES: Without any cyanosis, clubbing, rash, lesions or edema.    NEUROLOGIC: Grossly intact.      LABS:                        13.8   3.34  )-----------( 256      ( 08 Dec 2021 06:49 )             42.7     12-08    137  |  104  |  13  ----------------------------<  120<H>  3.9   |  26  |  0.90    Ca    8.5      08 Dec 2021 06:49  Phos  3.9     12-08  Mg     2.3     12-08    TPro  7.0  /  Alb  2.6<L>  /  TBili  0.7  /  DBili  x   /  AST  393<H>  /  ALT  278<H>  /  AlkPhos  39<L>  12-08    PT/INR - ( 07 Dec 2021 11:11 )   PT: 14.2 sec;   INR: 1.20 ratio       COVID-19 PCR: Detected: PERFORMED ON [facility]   EUA/IVD         CARDIAC MARKERS ( 07 Dec 2021 11:11 )  x     / x     / >15202 U/L / x     / x          D-Dimer Assay, Quantitative: 1263 ng/mL DDU (12-08-21 @ 06:49)        Procalcitonin, Serum: 0.41 ng/mL (12-07-21 @ 20:24)      MICROBIOLOGY:    RADIOLOGY & ADDITIONAL STUDIES:    CXR:  < from: Xray Chest 1 View- PORTABLE-Urgent (12.07.21 @ 13:07) >  IMPRESSION: Bilateral infiltrates as above.    < end of copied text >    Ct scan chest;    ekg;    echo:

## 2021-12-08 NOTE — PROGRESS NOTE ADULT - SUBJECTIVE AND OBJECTIVE BOX
-*-*- incomplete  NP Note discussed with  Primary Attending    INTERVAL HPI/OVERNIGHT EVENTS: Hypoxic and febrile overnight     MEDICATIONS  (STANDING):  dexAMETHasone  Injectable 6 milliGRAM(s) IV Push daily  enoxaparin Injectable 40 milliGRAM(s) SubCutaneous every 12 hours  lactated ringers. 1000 milliLiter(s) (100 mL/Hr) IV Continuous <Continuous>  pantoprazole    Tablet 40 milliGRAM(s) Oral before breakfast  remdesivir  IVPB   IV Intermittent   remdesivir  IVPB 100 milliGRAM(s) IV Intermittent every 24 hours    MEDICATIONS  (PRN):  acetaminophen     Tablet .. 325 milliGRAM(s) Oral every 4 hours PRN Temp greater or equal to 38C (100.4F)      __________________________________________________  REVIEW OF SYSTEMS:    CONSTITUTIONAL: +fever,   EYES: no acute visual disturbances  NECK: No pain or stiffness  RESPIRATORY: + No cough; +shortness of breath  CARDIOVASCULAR: No chest pain, no palpitations  GASTROINTESTINAL: No pain. No nausea or vomiting; No diarrhea   NEUROLOGICAL: No headache or numbness, no tremors  MUSCULOSKELETAL: No joint pain, no muscle pain  GENITOURINARY: no dysuria, no frequency, no hesitancy  PSYCHIATRY: no depression , no anxiety  ALL OTHER  ROS negative        Vital Signs Last 24 Hrs  T(C): 38 (08 Dec 2021 08:19), Max: 39.7 (07 Dec 2021 16:01)  T(F): 100.4 (08 Dec 2021 08:19), Max: 103.4 (07 Dec 2021 16:01)  HR: 75 (08 Dec 2021 08:19) (75 - 108)  BP: 129/74 (08 Dec 2021 05:13) (123/76 - 136/92)  BP(mean): --  RR: 16 (08 Dec 2021 05:13) (16 - 20)  SpO2: 92% (08 Dec 2021 09:05) (81% - 98%)    ________________________________________________  PHYSICAL EXAM:  GENERAL: NAD  HEENT: Normocephalic;  conjunctivae and sclerae clear  NECK : supple  CHEST/LUNG: diminished b/l   HEART: S1 S2  regular; no murmurs, gallops or rubs  ABDOMEN: Soft, Nontender, Nondistended; Bowel sounds present  EXTREMITIES: no cyanosis; no edema; no calf tenderness  SKIN: warm and dry; no rash  NERVOUS SYSTEM:  Awake and alert; Oriented  to place, person and time    _________________________________________________  LABS:                        13.8   3.34  )-----------( 256      ( 08 Dec 2021 06:49 )             42.7     12-08    137  |  104  |  13  ----------------------------<  120<H>  3.9   |  26  |  0.90    Ca    8.5      08 Dec 2021 06:49  Phos  3.9     12-08  Mg     2.3     12-08    TPro  7.0  /  Alb  2.6<L>  /  TBili  0.7  /  DBili  x   /  AST  393<H>  /  ALT  278<H>  /  AlkPhos  39<L>  12-08    PT/INR - ( 07 Dec 2021 11:11 )   PT: 14.2 sec;   INR: 1.20 ratio             CAPILLARY BLOOD GLUCOSE            RADIOLOGY & ADDITIONAL TESTS:   < from: Xray Chest 1 View- PORTABLE-Urgent (12.07.21 @ 13:07) >    EXAM:  XR CHEST PORTABLE URGENT 1V                            PROCEDURE DATE:  12/07/2021          INTERPRETATION:  AP erect chest on December 7, 2021 at 11:57 AM. 2 images. Patient is short of breath with cough and fever.    COMPARISON: None available.    Heart normal for projection.    Scattered infiltrates in the mid lower lung fields are noted possibly relating Covid pneumonia.    IMPRESSION: Bilateral infiltrates as above.    --- End of Report ---    < end of copied text >    Imaging Personally Reviewed:  YES    Consultant(s) Notes Reviewed:   YES    Plan of care was discussed with patient and /or primary care giver; all questions and concerns were addressed and care was aligned with patient's wishes.

## 2021-12-08 NOTE — CONSULT NOTE ADULT - PROBLEM SELECTOR RECOMMENDATION 9
isolation precautions  oxygen supp  monitor oxygen sat  check LDH, LFTs, CRP, Ferritin, D-Dimer and procalcitonin  Vit C, D and zinc supp  Montelukast 10 mgs po Daily  Pepcid 40 mgs po daily  Steroids IV  Bronchodilators  follow up cXR  follow up Covid PCR

## 2021-12-08 NOTE — PROGRESS NOTE ADULT - PROBLEM SELECTOR PLAN 1
c/w shortness of breadth  COVID +ve since Nov 27  Desat into high 80s, on 2 lit NC  D Dimer 998  Will start on remdesivir   Will start on decadron   Will sent covid biomarkers and antibodies   Monitor respiratory status   Lovenox 40mg bid BMI >30 c/w shortness of breath  COVID +ve since Nov 29  Day 2 remdesivir + decadron   supp 02 as needed   trend covid biomarkers and antibodies   Monitor respiratory status   Lovenox 40mg bid BMI >30  prone as tolerated

## 2021-12-09 ENCOUNTER — TRANSCRIPTION ENCOUNTER (OUTPATIENT)
Age: 21
End: 2021-12-09

## 2021-12-09 DIAGNOSIS — Z02.9 ENCOUNTER FOR ADMINISTRATIVE EXAMINATIONS, UNSPECIFIED: ICD-10-CM

## 2021-12-09 LAB
ALBUMIN SERPL ELPH-MCNC: 2.5 G/DL — LOW (ref 3.5–5)
ALP SERPL-CCNC: 42 U/L — SIGNIFICANT CHANGE UP (ref 40–120)
ALT FLD-CCNC: 230 U/L DA — HIGH (ref 10–60)
ANION GAP SERPL CALC-SCNC: 4 MMOL/L — LOW (ref 5–17)
AST SERPL-CCNC: 177 U/L — HIGH (ref 10–40)
BILIRUB SERPL-MCNC: 0.5 MG/DL — SIGNIFICANT CHANGE UP (ref 0.2–1.2)
BUN SERPL-MCNC: 12 MG/DL — SIGNIFICANT CHANGE UP (ref 7–18)
CALCIUM SERPL-MCNC: 9 MG/DL — SIGNIFICANT CHANGE UP (ref 8.4–10.5)
CHLORIDE SERPL-SCNC: 108 MMOL/L — SIGNIFICANT CHANGE UP (ref 96–108)
CK SERPL-CCNC: 4420 U/L — HIGH (ref 35–232)
CO2 SERPL-SCNC: 29 MMOL/L — SIGNIFICANT CHANGE UP (ref 22–31)
CREAT SERPL-MCNC: 1.05 MG/DL — SIGNIFICANT CHANGE UP (ref 0.5–1.3)
D DIMER BLD IA.RAPID-MCNC: 790 NG/ML DDU — HIGH
GLUCOSE SERPL-MCNC: 135 MG/DL — HIGH (ref 70–99)
HCT VFR BLD CALC: 41 % — SIGNIFICANT CHANGE UP (ref 39–50)
HGB BLD-MCNC: 13.3 G/DL — SIGNIFICANT CHANGE UP (ref 13–17)
MCHC RBC-ENTMCNC: 24 PG — LOW (ref 27–34)
MCHC RBC-ENTMCNC: 32.4 GM/DL — SIGNIFICANT CHANGE UP (ref 32–36)
MCV RBC AUTO: 73.9 FL — LOW (ref 80–100)
NRBC # BLD: 0 /100 WBCS — SIGNIFICANT CHANGE UP (ref 0–0)
PLATELET # BLD AUTO: 336 K/UL — SIGNIFICANT CHANGE UP (ref 150–400)
POTASSIUM SERPL-MCNC: 4.3 MMOL/L — SIGNIFICANT CHANGE UP (ref 3.5–5.3)
POTASSIUM SERPL-SCNC: 4.3 MMOL/L — SIGNIFICANT CHANGE UP (ref 3.5–5.3)
PROT SERPL-MCNC: 6.9 G/DL — SIGNIFICANT CHANGE UP (ref 6–8.3)
RBC # BLD: 5.55 M/UL — SIGNIFICANT CHANGE UP (ref 4.2–5.8)
RBC # FLD: 13.6 % — SIGNIFICANT CHANGE UP (ref 10.3–14.5)
SODIUM SERPL-SCNC: 141 MMOL/L — SIGNIFICANT CHANGE UP (ref 135–145)
WBC # BLD: 7.11 K/UL — SIGNIFICANT CHANGE UP (ref 3.8–10.5)
WBC # FLD AUTO: 7.11 K/UL — SIGNIFICANT CHANGE UP (ref 3.8–10.5)

## 2021-12-09 RX ORDER — ALBUTEROL 90 UG/1
2 AEROSOL, METERED ORAL EVERY 6 HOURS
Refills: 0 | Status: DISCONTINUED | OUTPATIENT
Start: 2021-12-09 | End: 2021-12-11

## 2021-12-09 RX ADMIN — Medication 6 MILLIGRAM(S): at 05:18

## 2021-12-09 RX ADMIN — SODIUM CHLORIDE 125 MILLILITER(S): 9 INJECTION INTRAMUSCULAR; INTRAVENOUS; SUBCUTANEOUS at 05:17

## 2021-12-09 RX ADMIN — FAMOTIDINE 20 MILLIGRAM(S): 10 INJECTION INTRAVENOUS at 11:38

## 2021-12-09 RX ADMIN — ENOXAPARIN SODIUM 40 MILLIGRAM(S): 100 INJECTION SUBCUTANEOUS at 17:18

## 2021-12-09 RX ADMIN — Medication 500 MILLIGRAM(S): at 11:38

## 2021-12-09 RX ADMIN — SODIUM CHLORIDE 100 MILLILITER(S): 9 INJECTION INTRAMUSCULAR; INTRAVENOUS; SUBCUTANEOUS at 21:14

## 2021-12-09 RX ADMIN — REMDESIVIR 500 MILLIGRAM(S): 5 INJECTION INTRAVENOUS at 14:25

## 2021-12-09 RX ADMIN — ENOXAPARIN SODIUM 40 MILLIGRAM(S): 100 INJECTION SUBCUTANEOUS at 05:18

## 2021-12-09 RX ADMIN — Medication 1 SPRAY(S): at 05:17

## 2021-12-09 RX ADMIN — MONTELUKAST 10 MILLIGRAM(S): 4 TABLET, CHEWABLE ORAL at 21:14

## 2021-12-09 RX ADMIN — Medication 2000 UNIT(S): at 11:38

## 2021-12-09 RX ADMIN — ZINC SULFATE TAB 220 MG (50 MG ZINC EQUIVALENT) 220 MILLIGRAM(S): 220 (50 ZN) TAB at 11:38

## 2021-12-09 RX ADMIN — Medication 1 SPRAY(S): at 17:17

## 2021-12-09 NOTE — PROGRESS NOTE ADULT - ASSESSMENT
20yo male, no known PMHx presents with SOB +cough, found COVID +.  Endorses being unvaccinated, first positve test was 11/29 at [facility].  Admitted to medicine for acute hypoxic resp fail 2/2 to COVID, requiring supplemental 02.  Started on Remdesivir and Dexamethasone D3, plan wean off 02 as tolerated.

## 2021-12-09 NOTE — PROGRESS NOTE ADULT - SUBJECTIVE AND OBJECTIVE BOX
Patient is a 21y old  Male who presents with a chief complaint of AHRF sec to COVID pneumonia (09 Dec 2021 06:47)    pt seen in icu [  ], reg med floor [   ], bed [  ], chair at bedside [   ], a+o x3 [  ], lethargic [  ],  nad [  ]    barrera [  ], ngt [  ], peg [  ], et tube [  ], cent line [  ], picc line [  ]        Allergies    No Known Allergies        Vitals    T(F): 98.2 (12-09-21 @ 05:25), Max: 100.4 (12-08-21 @ 08:19)  HR: 81 (12-09-21 @ 05:25) (75 - 85)  BP: 126/66 (12-09-21 @ 05:25) (118/87 - 129/70)  RR: 18 (12-09-21 @ 06:45) (18 - 20)  SpO2: 94% (12-09-21 @ 06:45) (86% - 96%)  Wt(kg): --  CAPILLARY BLOOD GLUCOSE          Labs                          13.8   3.34  )-----------( 256      ( 08 Dec 2021 06:49 )             42.7       12-08    137  |  104  |  13  ----------------------------<  120<H>  3.9   |  26  |  0.90    Ca    8.5      08 Dec 2021 06:49  Phos  3.9     12-08  Mg     2.3     12-08    TPro  7.0  /  Alb  2.6<L>  /  TBili  0.7  /  DBili  x   /  AST  393<H>  /  ALT  278<H>  /  AlkPhos  39<L>  12-08      CARDIAC MARKERS ( 08 Dec 2021 06:49 )  x     / x     / 71564 U/L / x     / x      CARDIAC MARKERS ( 07 Dec 2021 11:11 )  x     / x     / >17428 U/L / x     / x          Troponin I, High Sensitivity Result: 41.2 ng/L (12-07-21 @ 11:11)        Radiology Results      Meds    MEDICATIONS  (STANDING):  ascorbic acid 500 milliGRAM(s) Oral daily  cholecalciferol 2000 Unit(s) Oral daily  dexAMETHasone  Injectable 6 milliGRAM(s) IV Push daily  enoxaparin Injectable 40 milliGRAM(s) SubCutaneous every 12 hours  famotidine    Tablet 20 milliGRAM(s) Oral daily  fluticasone propionate 50 MICROgram(s)/spray Nasal Spray 1 Spray(s) Both Nostrils two times a day  lactated ringers. 1000 milliLiter(s) (100 mL/Hr) IV Continuous <Continuous>  montelukast 10 milliGRAM(s) Oral at bedtime  remdesivir  IVPB   IV Intermittent   remdesivir  IVPB 100 milliGRAM(s) IV Intermittent every 24 hours  sodium chloride 0.9%. 1000 milliLiter(s) (125 mL/Hr) IV Continuous <Continuous>  sodium chloride 0.9%. 1000 milliLiter(s) (100 mL/Hr) IV Continuous <Continuous>  zinc sulfate 220 milliGRAM(s) Oral daily      MEDICATIONS  (PRN):  acetaminophen     Tablet .. 325 milliGRAM(s) Oral every 4 hours PRN Temp greater or equal to 38C (100.4F)      Physical Exam    Neuro :  no focal deficits  Respiratory: CTA B/L  CV: RRR, S1S2, no murmurs,   Abdominal: Soft, NT, ND +BS,  Extremities: No edema, + peripheral pulses    ASSESSMENT    Infection due to severe acute respiratory syndrome coronavirus 2 (SARS-CoV-2)    No pertinent past medical history        PLAN     Patient is a 21y old  Male who presents with a chief complaint of AHRF sec to COVID pneumonia (09 Dec 2021 06:47)      pt seen in icu [  ], reg med floor [ x  ], bed [ x ], chair at bedside [   ], a+o x3 [x  ], lethargic [  ],  nad [ x ]      Allergies    No Known Allergies        Vitals    T(F): 98.2 (12-09-21 @ 05:25), Max: 100.4 (12-08-21 @ 08:19)  HR: 81 (12-09-21 @ 05:25) (75 - 85)  BP: 126/66 (12-09-21 @ 05:25) (118/87 - 129/70)  RR: 18 (12-09-21 @ 06:45) (18 - 20)  SpO2: 94% (12-09-21 @ 06:45) (86% - 96%)  Wt(kg): --  CAPILLARY BLOOD GLUCOSE          Labs                          13.8   3.34  )-----------( 256      ( 08 Dec 2021 06:49 )             42.7       12-08    137  |  104  |  13  ----------------------------<  120<H>  3.9   |  26  |  0.90    Ca    8.5      08 Dec 2021 06:49  Phos  3.9     12-08  Mg     2.3     12-08    TPro  7.0  /  Alb  2.6<L>  /  TBili  0.7  /  DBili  x   /  AST  393<H>  /  ALT  278<H>  /  AlkPhos  39<L>  12-08      CARDIAC MARKERS ( 08 Dec 2021 06:49 )  x     / x     / 56724 U/L / x     / x      CARDIAC MARKERS ( 07 Dec 2021 11:11 )  x     / x     / >61286 U/L / x     / x          Troponin I, High Sensitivity Result: 41.2 ng/L (12-07-21 @ 11:11)        Radiology Results      Meds    MEDICATIONS  (STANDING):  ascorbic acid 500 milliGRAM(s) Oral daily  cholecalciferol 2000 Unit(s) Oral daily  dexAMETHasone  Injectable 6 milliGRAM(s) IV Push daily  enoxaparin Injectable 40 milliGRAM(s) SubCutaneous every 12 hours  famotidine    Tablet 20 milliGRAM(s) Oral daily  fluticasone propionate 50 MICROgram(s)/spray Nasal Spray 1 Spray(s) Both Nostrils two times a day  lactated ringers. 1000 milliLiter(s) (100 mL/Hr) IV Continuous <Continuous>  montelukast 10 milliGRAM(s) Oral at bedtime  remdesivir  IVPB   IV Intermittent   remdesivir  IVPB 100 milliGRAM(s) IV Intermittent every 24 hours  sodium chloride 0.9%. 1000 milliLiter(s) (125 mL/Hr) IV Continuous <Continuous>  sodium chloride 0.9%. 1000 milliLiter(s) (100 mL/Hr) IV Continuous <Continuous>  zinc sulfate 220 milliGRAM(s) Oral daily      MEDICATIONS  (PRN):  acetaminophen     Tablet .. 325 milliGRAM(s) Oral every 4 hours PRN Temp greater or equal to 38C (100.4F)      Physical Exam    Neuro :  no focal deficits  Respiratory: CTA B/L  CV: RRR, S1S2, no murmurs,   Abdominal: Soft, NT, ND +BS,  Extremities: No edema, + peripheral pulses      ASSESSMENT    sepsis,   pneumonia 2nd to covid 19,   rhabdomyolysis,   transaminitis,   unvaccinated         PLAN    cont decadron and remdesevir,   vit c, vitamin d, zinc, pepcid, singulair,   contact and airborne isolation,   pulm f/u   cont albuterol inhaler,   fluticasone nasal spray bid   cont tylenol prn,   robitussin prn,   lovenox,   O2 sat 94% (86% - 96%) n/c 2 L  O2 via nasal canula,   covid-19 nucleocapsid antibody test positive noted  ,   procalcitonin, D-dimer, crp, ldh, ferritin, noted    cont dvt prophylaxis   cont ivf  ck improving  cont current meds

## 2021-12-09 NOTE — PROGRESS NOTE ADULT - PROBLEM SELECTOR PLAN 4
S/p IVF  Avoid hepatotoxins   Monitor CK  Discontinue remdesivir if ALT elevation is accompanied by signs or symptoms of liver inflammation or increasing conjugated bilirubin, alkaline phosphatase, or PT.

## 2021-12-09 NOTE — DISCHARGE NOTE PROVIDER - PROVIDER TOKENS
PROVIDER:[TOKEN:[[id number]],FOLLOWUP:[1 week],ESTABLISHEDPATIENT:[T]],PROVIDER:[TOKEN:[[id number]],FOLLOWUP:[1 week],ESTABLISHEDPATIENT:[T]]

## 2021-12-09 NOTE — DISCHARGE NOTE PROVIDER - NSDCCAREPROVSEEN_GEN_ALL_CORE_FT
[address] 
[phone] Patient: Steve Li MRN: [id number] CN Visit Information Date & Time Provider Department Dept. Phone Encounter #  
 3/29/2018  9:00 AM Rosalva Feliciano MD [address] [facility] [phone] [id number] Upcoming Health Maintenance Date Due  
 EYE EXAM RETINAL OR DILATED Q1 2018* HEMOGLOBIN A1C Q6M 2018 MEDICARE YEARLY EXAM 2018 MICROALBUMIN Q1 2018 LIPID PANEL Q1 2018 FOOT EXAM Q1 3/13/2019 DTaP/Tdap/Td series (2 - Td) 2026 *Topic was postponed. The date shown is not the original due date. Allergies as of 3/29/2018  Review Complete On: 3/19/2018 By: Rigoberto Yadav MD  
  
 Severity Noted Reaction Type Reactions Bactrim [Sulfamethoprim Ds]  2015    Other (comments) Nausea, abdominal pain and cramping Byetta [Exenatide]  10/21/2010    Rash Current Immunizations  Reviewed on 2017 Name Date Influenza Vaccine 10/20/2015, 10/8/2013 Influenza Vaccine (Quad) PF 2017, 2016 Influenza Vaccine PF 2012 Pneumococcal Conjugate (PCV-13) 2015 Pneumococcal Vaccine (Unspecified Type) 2011 TD Vaccine 2010 Not reviewed this visit You Were Diagnosed With   
  
 Codes Comments Cough    -  Primary ICD-10-CM: F17 ICD-9-CM: 786.2 Wheezing     ICD-10-CM: R06.2 ICD-9-CM: 786.07 Community acquired pneumonia, unspecified laterality     ICD-10-CM: J18.9 ICD-9-CM: 487 Vitals Pulse Temp Resp Height(growth percentile) Weight(growth percentile) SpO2  
 96 96 °F (35.6 °C) (Oral) 16 4' 7\" (1.397 m) 304 lb (137.9 kg) 99% BMI OB Status Smoking Status 70.66 kg/m2 Unknown Never Smoker Vitals History BMI and BSA Data Body Mass Index Body Surface Area  
 70.66 kg/m 2 2.31 m 2 Preferred Pharmacy Pharmacy Name Phone [facility] [address], [address] [place], [place] [address]y [address]  [facility] [phone] Your Updated Medication List  
  
   
This list is accurate as of 3/29/18  9:40 AM.  Always use your most recent med list.  
  
  
  
  
 Adhesive Bandage 6 X 8 \" Bndg Commonly known as:  Tendra Mepilex Border 1 Each by Apply Externally route daily. One, T14.8, 2x4cm open area with bloody exudate  
  
 albuterol 90 mcg/actuation inhaler Commonly known as:  PROVENTIL HFA, VENTOLIN HFA, PROAIR HFA Take 1 Puff by inhalation every four (4) hours as needed for Wheezing. Dispense 8g inhaler. ALPRAZolam 0.25 mg tablet Commonly known as:  XANAX  
TAKE 1 TABLET BY MOUTH THREE TIMES DAILY AS NEEDED FOR ANXIETY  
  
 ammonium lactate 12 % topical cream  
Commonly known as:  AMLACTIN  
APPLY TO THE AFFECTED AREA AND RUB IN WELL TWICE DAILY  
  
 azithromycin 250 mg tablet Commonly known as:  Maryellen Naya Take 2 tablets today, then take 1 tablet daily  
  
 diclofenac 1 % Gel Commonly known as:  VOLTAREN Apply  to affected area four (4) times daily. fenofibrate nanocrystallized 145 mg tablet Commonly known as:  TRICOR  
TAKE 1 TABLET BY MOUTH DAILY  
  
 fluticasone 50 mcg/actuation nasal spray Commonly known as:  Delmon Pickup 2 Sprays by Both Nostrils route daily. furosemide 20 mg tablet Commonly known as:  LASIX TAKE 1 TABLET BY MOUTH EVERY DAY  
  
 hydrOXYzine pamoate 25 mg capsule Commonly known as:  VISTARIL  
TAKE 1 CAPSULE BY MOUTH THREE TIMES DAILY AS NEEDED FOR ITCHING  
  
 imipramine 25 mg tablet Commonly known as:  TOFRANIL Take 3 Tabs by mouth nightly for 90 days. insulin glargine 100 unit/mL (3 mL) Inpn Commonly known as:  LANCORRIE LANGAGLAR  
by SubCUTAneous route two (2) times a day.  80mg am and 80mg pm.  
  
 mupirocin calcium 2 % topical cream  
Commonly known as:  [employer]  
 APPLY EXTERNALLY TO THE AFFECTED AREA EVERY DAY AS DIRECTED  
  
 naftifine 1 % topical cream  
Commonly known as:  NAFTIN  
APPLY EXTERNALLY TO THE AFFECTED AREA EVERY DAY FOR 2 WEEKS OR AS DIRECTED NovoLOG Flexpen U-100 Insulin 100 unit/mL Inpn Generic drug:  insulin aspart U-100  
by SubCUTAneous route Before breakfast, lunch, dinner and at bedtime. Sliding scale  
  
 nystatin-triamcinolone topical cream  
Commonly known as:  MYCOLOG II  
APPLY EXTERNALLY TO THE AFFECTED AREA TWICE DAILY pantoprazole 40 mg tablet Commonly known as:  PROTONIX Take 1 Tab by mouth daily. risperiDONE 1 mg tablet Commonly known as:  RisperDAL Take 2 Tabs by mouth two (2) times a day. Indications: psychosis  
  
 silver sulfADIAZINE 1 % topical cream  
Commonly known as:  SILVADENE Apply  to affected area daily. Apply daily as directed Prescriptions Sent to Pharmacy Refills  
 azithromycin (ZITHROMAX) 250 mg tablet 0 Sig: Take 2 tablets today, then take 1 tablet daily Class: Normal  
 [facility]: [facility] [address] [place] [address] [address] [facility] [address] [place] [address] [address] ([facility] Ph #: [phone]  
 albuterol (PROVENTIL HFA, VENTOLIN HFA, PROAIR HFA) 90 mcg/actuation inhaler 0 Sig: Take 1 Puff by inhalation every four (4) hours as needed for Wheezing. Dispense 8g inhaler. Class: Normal  
 [facility] [address] AT [place] [address] [address] [facility] [address] ([facility] Ph #: [phone] Route: Inhalation To-Do List   
 03/29/2018 Imaging:  XR CHEST PA LAT Patient Instructions Pneumonia: Care Instructions Your Care Instructions Pneumonia is an infection of the lungs. Most cases are caused by infections from bacteria or viruses. Pneumonia may be mild or very severe. If it is caused by bacteria, you will be treated with antibiotics.  It may take a few weeks to a few months to recover fully from pneumonia, depending on how sick you were and whether your overall health is good. Follow-up care is a key part of your treatment and safety. Be sure to make and go to all appointments, and call your doctor if you are having problems. It's also a good idea to know your test results and keep a list of the medicines you take. How can you care for yourself at home? · Take your antibiotics exactly as directed. Do not stop taking the medicine just because you are feeling better. You need to take the full course of antibiotics. · Take your medicines exactly as prescribed. Call your doctor if you think you are having a problem with your medicine. · Get plenty of rest and sleep. You may feel weak and tired for a while, but your energy level will improve with time. · To prevent dehydration, drink plenty of fluids, enough so that your urine is light yellow or clear like water. Choose water and other caffeine-free clear liquids until you feel better. If you have kidney, heart, or liver disease and have to limit fluids, talk with your doctor before you increase the amount of fluids you drink. · Take care of your cough so you can rest. A cough that brings up mucus from your lungs is common with pneumonia. It is one way your body gets rid of the infection. But if coughing keeps you from resting or causes severe fatigue and chest-wall pain, talk to your doctor. He or she may suggest that you take a medicine to reduce the cough. · Use a vaporizer or humidifier to add moisture to your bedroom. Follow the directions for cleaning the machine. · Do not smoke or allow others to smoke around you. Smoke will make your cough last longer. If you need help quitting, talk to your doctor about stop-smoking programs and medicines. These can increase your chances of quitting for good.  
· Take an over-the-counter pain medicine, such as acetaminophen (Tylenol), ibuprofen (Advil, Motrin), or naproxen (Aleve). Read and follow all instructions on the label. · Do not take two or more pain medicines at the same time unless the doctor told you to. Many pain medicines have acetaminophen, which is Tylenol. Too much acetaminophen (Tylenol) can be harmful. · If you were given a spirometer to measure how well your lungs are working, use it as instructed. This can help your doctor tell how your recovery is going. · To prevent pneumonia in the future, talk to your doctor about getting a flu vaccine (once a year) and a pneumococcal vaccine (one time only for most people). When should you call for help? Call 911 anytime you think you may need emergency care. For example, call if: 
? · You have severe trouble breathing. ?Call your doctor now or seek immediate medical care if: 
? · You cough up dark brown or bloody mucus (sputum). ? · You have new or worse trouble breathing. ? · You are dizzy or lightheaded, or you feel like you may faint. ? Watch closely for changes in your health, and be sure to contact your doctor if: 
? · You have a new or higher fever. ? · You are coughing more deeply or more often. ? · You are not getting better after 2 days (48 hours). ? · You do not get better as expected. Where can you learn more? Go to http://darius-gricelda.info/. Enter [id number] in the search box to learn more about \"Pneumonia: Care Instructions. \" Current as of: May 12, 2017 Content Version: 11.4 © 6147-6525 [employer]. Care instructions adapted under license by [employer] (which disclaims liability or warranty for this information). If you have questions about a medical condition or this instruction, always ask your healthcare professional. [address] any warranty or liability for your use of this information. Introducing [facility] SERVICES! [facility] introduces TrustedID patient portal. Now you can access parts of your medical record, email your doctor's office, and request medication refills online. 1. In your internet browser, go to [email or web address] 2. Click on the First Time User? Click Here link in the Sign In box. You will see the New Member Sign Up page. 3. Enter your TrustedID Access Code exactly as it appears below. You will not need to use this code after youve completed the sign-up process. If you do not sign up before the expiration date, you must request a new code. · Trusted[employer] Access Code: [email or web address] Expires: 5/24/2018 11:13 AM 
 
4. Enter the last four digits of your Social Security Number (xxxx) and Date of Birth (mm/dd/yyyy) as indicated and click Submit. You will be taken to the next sign-up page. 5. Create a TrustedID ID. This will be your TrustedID login ID and cannot be changed, so think of one that is secure and easy to remember. 6. Create a TrustedID password. You can change your password at any time. 7. Enter your Password Reset Question and Answer. This can be used at a later time if you forget your password. 8. Enter your e-mail address. You will receive e-mail notification when new information is available in [address] 9. Click Sign Up. You can now view and download portions of your medical record. 10. Click the Download Summary menu link to download a portable copy of your medical information. If you have questions, please visit the Frequently Asked Questions section of the Trusted[employer] website. Remember, TrustedID is NOT to be used for urgent needs. For medical emergencies, dial 911. Now available from your iPhone and Android! Please provide this summary of care documentation to your next provider. Your primary care clinician is listed as Samuel Chamberlain. If you have any questions after today's visit, please call [phone]. Richard Lorenzo Richard S

## 2021-12-09 NOTE — PROGRESS NOTE ADULT - PROBLEM SELECTOR PLAN 1
isolation precautions  oxygen supp  monitor oxygen sat  monitor LFTs, LDH, CRP, D-Dimer, Ferritin, procalcitonin  cont meds  follow up Covid PCR  Follow up CXR

## 2021-12-09 NOTE — PROGRESS NOTE ADULT - SUBJECTIVE AND OBJECTIVE BOX
Patient is a 21y old  Male who presents with a chief complaint of AHRF sec to COVID pneumonia (09 Dec 2021 07:56)  Awake, alert, comfortable in bed in NAD. Still on oxygen supp via NC. Desaturated to 70's while walking to [place] without oxygen    INTERVAL HPI/OVERNIGHT EVENTS:      VITAL SIGNS:  T(F): 98.2 (12-09-21 @ 05:25)  HR: 81 (12-09-21 @ 05:25)  BP: 126/66 (12-09-21 @ 05:25)  RR: 18 (12-09-21 @ 06:45)  SpO2: 94% (12-09-21 @ 06:45)  Wt(kg): --  I&O's Detail          REVIEW OF SYSTEMS:    CONSTITUTIONAL:  No fevers, chills, sweats    HEENT:  Eyes:  No diplopia or blurred vision. ENT:  No earache, sore throat or runny nose.    CARDIOVASCULAR:  No pressure, squeezing, tightness, or heaviness about the chest; no palpitations.    RESPIRATORY:  Per HPI    GASTROINTESTINAL:  No abdominal pain, nausea, vomiting or diarrhea.    GENITOURINARY:  No dysuria, frequency or urgency.    NEUROLOGIC:  No paresthesias, fasciculations, seizures or weakness.    PSYCHIATRIC:  No disorder of thought or mood.      PHYSICAL EXAM:    Constitutional: Well developed and nourished  Eyes:Perrla  ENMT: normal  Neck:supple  Respiratory: Rales on right post  Cardiovascular: S1 S2 regular  Gastrointestinal: Soft, Non tender  Extremities: No edema  Vascular:normal  Neurological:Awake, alert,Ox3  Musculoskeletal:Normal      MEDICATIONS  (STANDING):  ascorbic acid 500 milliGRAM(s) Oral daily  cholecalciferol 2000 Unit(s) Oral daily  dexAMETHasone  Injectable 6 milliGRAM(s) IV Push daily  enoxaparin Injectable 40 milliGRAM(s) SubCutaneous every 12 hours  famotidine    Tablet 20 milliGRAM(s) Oral daily  fluticasone propionate 50 MICROgram(s)/spray Nasal Spray 1 Spray(s) Both Nostrils two times a day  lactated ringers. 1000 milliLiter(s) (100 mL/Hr) IV Continuous <Continuous>  montelukast 10 milliGRAM(s) Oral at bedtime  remdesivir  IVPB   IV Intermittent   remdesivir  IVPB 100 milliGRAM(s) IV Intermittent every 24 hours  sodium chloride 0.9%. 1000 milliLiter(s) (125 mL/Hr) IV Continuous <Continuous>  sodium chloride 0.9%. 1000 milliLiter(s) (100 mL/Hr) IV Continuous <Continuous>  zinc sulfate 220 milliGRAM(s) Oral daily    MEDICATIONS  (PRN):  acetaminophen     Tablet .. 325 milliGRAM(s) Oral every 4 hours PRN Temp greater or equal to 38C (100.4F)  ALBUTerol    90 MICROgram(s) HFA Inhaler 2 Puff(s) Inhalation every 6 hours PRN Shortness of Breath      Allergies    No Known Allergies    Intolerances        LABS:                        13.8   3.34  )-----------( 256      ( 08 Dec 2021 06:49 )             42.7     12-08    137  |  104  |  13  ----------------------------<  120<H>  3.9   |  26  |  0.90    Ca    8.5      08 Dec 2021 06:49  Phos  3.9     12-08  Mg     2.3     12-08    TPro  7.0  /  Alb  2.6<L>  /  TBili  0.7  /  DBili  x   /  AST  393<H>  /  ALT  278<H>  /  AlkPhos  39<L>  12-08    PT/INR - ( 07 Dec 2021 11:11 )   PT: 14.2 sec;   INR: 1.20 ratio               CARDIAC MARKERS ( 08 Dec 2021 06:49 )  x     / x     / [id number] U/L / x     / x      CARDIAC MARKERS ( 07 Dec 2021 11:11 )  x     / x     / >44847 U/L / x     / x          CAPILLARY BLOOD GLUCOSE        pro-bnp -- 12-08 @ 06:49     d-dimer 1263  12-08 @ 06:49  pro-bnp -- 12-07 @ 11:11     d-dimer 998  12-07 @ 11:11      RADIOLOGY & ADDITIONAL TESTS:    CXR:    Ct scan chest:    ekg;    echo:

## 2021-12-09 NOTE — DISCHARGE NOTE PROVIDER - NSDCCPCAREPLAN_GEN_ALL_CORE_FT
PRINCIPAL DISCHARGE DIAGNOSIS  Diagnosis: Acute respiratory failure with hypoxia  Assessment and Plan of Treatment: Please continue taking your medications as prescribed. Follow up with your Pulmunologist Dr Lorenzo within 1 week for re-evalution and further treatment. Please stay Quarantined and Isolated as per CDC guideliness to stop spread of COVID infection in community.   For any worsening symptoms such as shortness of breath, chest pain, abdominal pain, any bleeding call 911 and go to the nearest ED.   Please follow up with your Pulmonologist for evaluation for future COVID Vaccination after resolution of your acute treatment      SECONDARY DISCHARGE DIAGNOSES  Diagnosis: Pneumonia due to COVID-19 virus  Assessment and Plan of Treatment: Please continue taking your medications as prescribed. Follow up with your Pulmunologist Dr Lorenzo within 1 week for re-evalution and further treatment. Please stay Quarantined and Isolated as per CDC guideliness to stop spread of COVID infection in community.   For any worsening symptoms such as shortness of breath, chest pain, abdominal pain, any bleeding call 911 and go to the nearest ED.   Please follow up with your Pulmonologist for evaluation for future COVID Vaccination after resolution of your acute treatment    Diagnosis: Transaminitis  Assessment and Plan of Treatment: Drink pleanty of fluids and have a helathy diet. Take your medications as prescribed. Do not drink alcohol, do not smoke and do not use any illit or not prescribed by your medical provider drugs.    Diagnosis: Rhabdomyolysis  Assessment and Plan of Treatment: Drink plenty of water. Stay hydrated, do not perform any strenous activitities till your follow up with your medical probider within 1 week.     PRINCIPAL DISCHARGE DIAGNOSIS  Diagnosis: Acute respiratory failure with hypoxia  Assessment and Plan of Treatment: Please continue taking your medications as prescribed. Follow up with your Pulmunologist Dr Lorenzo within 1 week for re-evalution and further treatment. Please stay Quarantined and Isolated as per CDC guideliness to stop spread of COVID infection in community.   For any worsening symptoms such as shortness of breath, chest pain, abdominal pain, any bleeding call 911 and go to the nearest ED.   Please follow up with your Pulmonologist for evaluation for future COVID Vaccination after resolution of your acute treatment  Your medication was send to [facility] [id number] [facility] tel [phone]  It was verified with [facility] pharmacy that all prescriptions are covered by your insurance   If you have any questiosn please call us back  or your Primary Care Physician         SECONDARY DISCHARGE DIAGNOSES  Diagnosis: Pneumonia due to COVID-19 virus  Assessment and Plan of Treatment: Please continue taking your medications as prescribed. Follow up with your Pulmunologist Dr Lorenzo within 1 week for re-evalution and further treatment. Please stay Quarantined and Isolated as per CDC guideliness to stop spread of COVID infection in community.   For any worsening symptoms such as shortness of breath, chest pain, abdominal pain, any bleeding call 911 and go to the nearest ED.   Please follow up with your Pulmonologist for evaluation for future COVID Vaccination after resolution of your acute treatment    Diagnosis: Transaminitis  Assessment and Plan of Treatment: Drink pleanty of fluids and have a helathy diet. Take your medications as prescribed. Do not drink alcohol, do not smoke and do not use any illit or not prescribed by your medical provider drugs.    Diagnosis: Rhabdomyolysis  Assessment and Plan of Treatment: Drink plenty of water. Stay hydrated, do not perform any strenous activitities till your follow up with your medical probider within 1 week.

## 2021-12-09 NOTE — DISCHARGE NOTE PROVIDER - NSDCMRMEDTOKEN_GEN_ALL_CORE_FT
acetaminophen 325 mg oral tablet: 1 tab(s) orally every 4 hours, As needed, Temp greater or equal to 38C (100.4F)  albuterol 90 mcg/inh inhalation aerosol: 2 puff(s) inhaled every 6 hours, As needed, Shortness of Breath  ascorbic acid 500 mg oral tablet: 1 tab(s) orally once a day  DexPak 6 DayTaperpak 1.5 mg oral tablet: 1 tab(s) orally 2 times a day   famotidine 20 mg oral tablet: 1 tab(s) orally once a day  fluticasone 50 mcg/inh nasal spray: 1 spray(s) nasal 2 times a day  montelukast 10 mg oral tablet: 1 tab(s) orally once a day (at bedtime)  Xarelto 10 mg oral tablet: 1 tab(s) orally once a day for pulmonary embolus prevention  zinc sulfate 220 mg oral capsule: 1 cap(s) orally once a day   acetaminophen 325 mg oral tablet: 1 tab(s) orally every 4 hours, As needed, Temp greater or equal to 38C (100.4F)  ascorbic acid 500 mg oral tablet: 1 tab(s) orally once a day  Xarelto 10 mg oral tablet: 1 tab(s) orally once a day for pulmonary embolus prevention  zinc sulfate 220 mg oral capsule: 1 cap(s) orally once a day

## 2021-12-09 NOTE — DISCHARGE NOTE PROVIDER - HOSPITAL COURSE
20 yo M no known PMHx came with shortness of breadth for 1 day.  COVID +ve since Nov 27. Desat into high 80s, on 2 lit NC. Was started on remdesivir   & decadron. Admitted for Covid pneumonia & was started on Lovenox 40mg bid BMI >30.       >>>>>>>>>>>>>>>>>>>>>>>>>>>>>>>>INCOMPLETE>>>>>>>>>>>>>>>>>>>>>>>>>>>>>>>>>>>>>>>>>>>>>>>       20 yo M no known PMHx came with shortness of breadth for 1 day.  COVID +ve since Nov 27. Desaturating into high 80s on supplemental oxygen with 2 liters via Nasal Cannula. Patient was started on remdesivir   and decadron as per current protocol. Admitted for Covid pneumonia. Treated additionally with Lovenox 40mg bid BMI >30.    Patient tolerated treatment well, with initial concern for need for home oxygen. Patient with no need for supplemental oxygen, ambulatory eager to go home.   Patient will continue anticoagulation treatment for additional 30 days. Patient to follow up with pulmonologist Dr Lorenzo within 1 week     Discharge planning as per attending Dr Sun.

## 2021-12-09 NOTE — PROGRESS NOTE ADULT - SUBJECTIVE AND OBJECTIVE BOX
NP Note discussed with  primary attending    Patient is a 21y old  Male who presents with a chief complaint of AHRF sec to COVID pneumonia (09 Dec 2021 10:50)      INTERVAL HPI/OVERNIGHT EVENTS: Concerned about his breathing, States, I'm breathing fine but my oxygen keeps going up and down."  No other complaints or concerns.        MEDICATIONS  (STANDING):  ascorbic acid 500 milliGRAM(s) Oral daily  cholecalciferol 2000 Unit(s) Oral daily  dexAMETHasone  Injectable 6 milliGRAM(s) IV Push daily  enoxaparin Injectable 40 milliGRAM(s) SubCutaneous every 12 hours  famotidine    Tablet 20 milliGRAM(s) Oral daily  fluticasone propionate 50 MICROgram(s)/spray Nasal Spray 1 Spray(s) Both Nostrils two times a day  lactated ringers. 1000 milliLiter(s) (100 mL/Hr) IV Continuous <Continuous>  montelukast 10 milliGRAM(s) Oral at bedtime  remdesivir  IVPB   IV Intermittent   remdesivir  IVPB 100 milliGRAM(s) IV Intermittent every 24 hours  sodium chloride 0.9%. 1000 milliLiter(s) (125 mL/Hr) IV Continuous <Continuous>  sodium chloride 0.9%. 1000 milliLiter(s) (100 mL/Hr) IV Continuous <Continuous>  zinc sulfate 220 milliGRAM(s) Oral daily    MEDICATIONS  (PRN):  acetaminophen     Tablet .. 325 milliGRAM(s) Oral every 4 hours PRN Temp greater or equal to 38C (100.4F)  ALBUTerol    90 MICROgram(s) HFA Inhaler 2 Puff(s) Inhalation every 6 hours PRN Shortness of Breath      __________________________________________________  REVIEW OF SYSTEMS:    CONSTITUTIONAL: Afebrile    EYES: no acute visual disturbances  NECK: No pain or stiffness  RESPIRATORY: (+) Cough; (+) Shortness of breath  CARDIOVASCULAR: No chest pain, no palpitations  GASTROINTESTINAL: No pain. No nausea or vomiting; No diarrhea   NEUROLOGICAL: No headache or numbness, no tremors  MUSCULOSKELETAL: No joint pain, no muscle pain  GENITOURINARY: no dysuria, no frequency, no hesitancy  PSYCHIATRY: no depression , no anxiety  ALL OTHER  ROS negative        Vital Signs Last 24 Hrs  T(C): 36.7 (09 Dec 2021 12:35), Max: 37.3 (08 Dec 2021 21:48)  T(F): 98 (09 Dec 2021 12:35), Max: 99.2 (08 Dec 2021 21:48)  HR: 75 (09 Dec 2021 12:35) (75 - 81)  BP: 124/69 (09 Dec 2021 12:35) (118/87 - 126/66)  BP(mean): --  RR: 23 (09 Dec 2021 16:58) (18 - 23)  SpO2: 86% (09 Dec 2021 16:58) (86% - 97%)    ________________________________________________    PHYSICAL EXAM:  GENERAL: NAD  HEENT: Normocephalic;  conjunctivae and sclerae clear  NECK : Supple  CHEST/LUNG: Decreased BS x 4 quad,   HEART: S1 S2  regular; no murmurs, gallops or rubs  ABDOMEN: Soft, Nontender, Nondistended; Bowel sounds present  EXTREMITIES: no cyanosis; no edema; no calf tenderness  SKIN: warm and dry; no rash  NERVOUS SYSTEM:  Awake and alert; Oriented  to place, person and time  _________________________________________________  LABS:                        13.3   7.11  )-----------( 336      ( 09 Dec 2021 11:52 )             41.0     12-09    141  |  108  |  12  ----------------------------<  135<H>  4.3   |  29  |  1.05    Ca    9.0      09 Dec 2021 11:52  Phos  3.9     12-08  Mg     2.3     12-08    TPro  6.9  /  Alb  2.5<L>  /  TBili  0.5  /  DBili  x   /  AST  177<H>  /  ALT  230<H>  /  AlkPhos  42  12-09        CAPILLARY BLOOD GLUCOSE            RADIOLOGY & ADDITIONAL TESTS:    Imaging Personally Reviewed:  YES    Consultant(s) Notes Reviewed:   YES  Care Discussed with Consultants :     Plan of care was discussed with patient and /or primary care giver; all questions and concerns were addressed and care was aligned with patient's wishes.

## 2021-12-09 NOTE — PROGRESS NOTE ADULT - PROBLEM SELECTOR PLAN 1
C/w Remdesivir D3/5  C/w Dexamethasone   C/w oxygen supp  Maintain isolation precautions  Monitor oxygen sat  Prone as tolerated  Monitor LFTs, LDH, CRP, D-Dimer, Ferritin, procalcitonin  Lovenox 40mg bid BMI >30

## 2021-12-09 NOTE — DISCHARGE NOTE PROVIDER - CARE PROVIDER_API CALL
Richard Lorenzo)  Internal Medicine; Pulmonary Disease  [phone] [place] [address]  Phone: [phone]  Fax: [phone]  Established Patient  Follow Up Time: 1 week    Jordin Sun)  Internal Medicine  [address] [place] [address]  Phone: [phone]  Fax: [phone]  Established Patient  Follow Up Time: 1 week

## 2021-12-10 LAB
ALBUMIN SERPL ELPH-MCNC: 2.5 G/DL — LOW (ref 3.5–5)
ALP SERPL-CCNC: 44 U/L — SIGNIFICANT CHANGE UP (ref 40–120)
ALT FLD-CCNC: 234 U/L DA — HIGH (ref 10–60)
ANION GAP SERPL CALC-SCNC: 6 MMOL/L — SIGNIFICANT CHANGE UP (ref 5–17)
AST SERPL-CCNC: 128 U/L — HIGH (ref 10–40)
BILIRUB SERPL-MCNC: 0.6 MG/DL — SIGNIFICANT CHANGE UP (ref 0.2–1.2)
BUN SERPL-MCNC: 15 MG/DL — SIGNIFICANT CHANGE UP (ref 7–18)
CALCIUM SERPL-MCNC: 8.7 MG/DL — SIGNIFICANT CHANGE UP (ref 8.4–10.5)
CHLORIDE SERPL-SCNC: 107 MMOL/L — SIGNIFICANT CHANGE UP (ref 96–108)
CK SERPL-CCNC: 1604 U/L — HIGH (ref 35–232)
CO2 SERPL-SCNC: 27 MMOL/L — SIGNIFICANT CHANGE UP (ref 22–31)
CREAT SERPL-MCNC: 1.07 MG/DL — SIGNIFICANT CHANGE UP (ref 0.5–1.3)
GLUCOSE SERPL-MCNC: 112 MG/DL — HIGH (ref 70–99)
HCT VFR BLD CALC: 40.4 % — SIGNIFICANT CHANGE UP (ref 39–50)
HGB BLD-MCNC: 13 G/DL — SIGNIFICANT CHANGE UP (ref 13–17)
MCHC RBC-ENTMCNC: 23.5 PG — LOW (ref 27–34)
MCHC RBC-ENTMCNC: 32.2 GM/DL — SIGNIFICANT CHANGE UP (ref 32–36)
MCV RBC AUTO: 73.1 FL — LOW (ref 80–100)
NRBC # BLD: 0 /100 WBCS — SIGNIFICANT CHANGE UP (ref 0–0)
PLATELET # BLD AUTO: 382 K/UL — SIGNIFICANT CHANGE UP (ref 150–400)
POTASSIUM SERPL-MCNC: 4.3 MMOL/L — SIGNIFICANT CHANGE UP (ref 3.5–5.3)
POTASSIUM SERPL-SCNC: 4.3 MMOL/L — SIGNIFICANT CHANGE UP (ref 3.5–5.3)
PROT SERPL-MCNC: 6.3 G/DL — SIGNIFICANT CHANGE UP (ref 6–8.3)
RBC # BLD: 5.53 M/UL — SIGNIFICANT CHANGE UP (ref 4.2–5.8)
RBC # FLD: 13.7 % — SIGNIFICANT CHANGE UP (ref 10.3–14.5)
SODIUM SERPL-SCNC: 140 MMOL/L — SIGNIFICANT CHANGE UP (ref 135–145)
WBC # BLD: 6.71 K/UL — SIGNIFICANT CHANGE UP (ref 3.8–10.5)
WBC # FLD AUTO: 6.71 K/UL — SIGNIFICANT CHANGE UP (ref 3.8–10.5)

## 2021-12-10 RX ORDER — SODIUM CHLORIDE 9 MG/ML
1000 INJECTION INTRAMUSCULAR; INTRAVENOUS; SUBCUTANEOUS
Refills: 0 | Status: DISCONTINUED | OUTPATIENT
Start: 2021-12-10 | End: 2021-12-10

## 2021-12-10 RX ADMIN — MONTELUKAST 10 MILLIGRAM(S): 4 TABLET, CHEWABLE ORAL at 21:07

## 2021-12-10 RX ADMIN — ENOXAPARIN SODIUM 40 MILLIGRAM(S): 100 INJECTION SUBCUTANEOUS at 05:26

## 2021-12-10 RX ADMIN — Medication 2000 UNIT(S): at 12:04

## 2021-12-10 RX ADMIN — Medication 1 SPRAY(S): at 17:19

## 2021-12-10 RX ADMIN — REMDESIVIR 500 MILLIGRAM(S): 5 INJECTION INTRAVENOUS at 13:40

## 2021-12-10 RX ADMIN — FAMOTIDINE 20 MILLIGRAM(S): 10 INJECTION INTRAVENOUS at 12:04

## 2021-12-10 RX ADMIN — ENOXAPARIN SODIUM 40 MILLIGRAM(S): 100 INJECTION SUBCUTANEOUS at 17:18

## 2021-12-10 RX ADMIN — Medication 500 MILLIGRAM(S): at 12:04

## 2021-12-10 RX ADMIN — Medication 6 MILLIGRAM(S): at 05:27

## 2021-12-10 RX ADMIN — Medication 1 SPRAY(S): at 05:26

## 2021-12-10 RX ADMIN — ZINC SULFATE TAB 220 MG (50 MG ZINC EQUIVALENT) 220 MILLIGRAM(S): 220 (50 ZN) TAB at 12:04

## 2021-12-10 NOTE — PROGRESS NOTE ADULT - SUBJECTIVE AND OBJECTIVE BOX
Patient is a 21y old  Male who presents with a chief complaint of AHRF sec to COVID pneumonia (10 Dec 2021 06:19)  Awake, alert, comfortable in bed in NAD. Remains on oxygen supp via NC. Still desaturates upon exertion    INTERVAL HPI/OVERNIGHT EVENTS:      VITAL SIGNS:  T(F): 98.4 (12-10-21 @ 05:03)  HR: 60 (12-10-21 @ 05:48)  BP: 119/71 (12-10-21 @ 05:03)  RR: 20 (12-10-21 @ 05:48)  SpO2: 98% (12-10-21 @ 05:48)  Wt(kg): --  I&O's Detail          REVIEW OF SYSTEMS:    CONSTITUTIONAL:  No fevers, chills, sweats    HEENT:  Eyes:  No diplopia or blurred vision. ENT:  No earache, sore throat or runny nose.    CARDIOVASCULAR:  No pressure, squeezing, tightness, or heaviness about the chest; no palpitations.    RESPIRATORY:  Per HPI    GASTROINTESTINAL:  No abdominal pain, nausea, vomiting or diarrhea.    GENITOURINARY:  No dysuria, frequency or urgency.    NEUROLOGIC:  No paresthesias, fasciculations, seizures or weakness.    PSYCHIATRIC:  No disorder of thought or mood.      PHYSICAL EXAM:    Constitutional: Well developed and nourished  Eyes:Perrla  ENMT: normal  Neck:supple  Respiratory: good air entry  Cardiovascular: S1 S2 regular  Gastrointestinal: Soft, Non tender  Extremities: No edema  Vascular:normal  Neurological:Awake, alert,Ox3  Musculoskeletal:Normal      MEDICATIONS  (STANDING):  ascorbic acid 500 milliGRAM(s) Oral daily  cholecalciferol 2000 Unit(s) Oral daily  dexAMETHasone  Injectable 6 milliGRAM(s) IV Push daily  enoxaparin Injectable 40 milliGRAM(s) SubCutaneous every 12 hours  famotidine    Tablet 20 milliGRAM(s) Oral daily  fluticasone propionate 50 MICROgram(s)/spray Nasal Spray 1 Spray(s) Both Nostrils two times a day  lactated ringers. 1000 milliLiter(s) (100 mL/Hr) IV Continuous <Continuous>  montelukast 10 milliGRAM(s) Oral at bedtime  remdesivir  IVPB   IV Intermittent   remdesivir  IVPB 100 milliGRAM(s) IV Intermittent every 24 hours  sodium chloride 0.9%. 1000 milliLiter(s) (125 mL/Hr) IV Continuous <Continuous>  sodium chloride 0.9%. 1000 milliLiter(s) (100 mL/Hr) IV Continuous <Continuous>  zinc sulfate 220 milliGRAM(s) Oral daily    MEDICATIONS  (PRN):  acetaminophen     Tablet .. 325 milliGRAM(s) Oral every 4 hours PRN Temp greater or equal to 38C (100.4F)  ALBUTerol    90 MICROgram(s) HFA Inhaler 2 Puff(s) Inhalation every 6 hours PRN Shortness of Breath      Allergies    No Known Allergies    Intolerances        LABS:                        13.0   6.71  )-----------( 382      ( 10 Dec 2021 07:04 )             40.4     12-10    140  |  107  |  15  ----------------------------<  112<H>  4.3   |  27  |  1.07    Ca    8.7      10 Dec 2021 07:04    TPro  6.3  /  Alb  2.5<L>  /  TBili  0.6  /  DBili  x   /  AST  128<H>  /  ALT  234<H>  /  AlkPhos  44  12-10          CARDIAC MARKERS ( 10 Dec 2021 07:04 )  x     / x     / 1604 U/L / x     / x      CARDIAC MARKERS ( 09 Dec 2021 11:52 )  x     / x     / 4420 U/L / x     / x          CAPILLARY BLOOD GLUCOSE        pro-bnp -- 12-09 @ 11:52     d-dimer 790  12-09 @ 11:52  pro-bnp -- 12-08 @ 06:49     d-dimer 1263  12-08 @ 06:49  pro-bnp -- 12-07 @ 11:11     d-dimer 998  12-07 @ 11:11      RADIOLOGY & ADDITIONAL TESTS:    CXR:    Ct scan chest:    ekg;    echo:

## 2021-12-10 NOTE — PROGRESS NOTE ADULT - PROBLEM SELECTOR PLAN 6
May require home oxygen.  Home oxygen form in chart.  Case management aware and working on setting up home O2.
May require home oxygen.  Home oxygen form in chart.

## 2021-12-10 NOTE — PROGRESS NOTE ADULT - ASSESSMENT
22yo male, no known PMHx presents with SOB +cough, found COVID +.  Endorses being unvaccinated, first positve test was 11/29 at [facility].  Admitted to medicine for acute hypoxic resp fail 2/2 to COVID, requiring supplemental 02.  Started on Remdesivir and Dexamethasone D4, plan wean off 02 as tolerated, but setting up home O2 so available if necessary.   O2 sats 12/10/21   93% at rest on 2LNC  88% with ambulation on 2LNC  82% with ambulation and no O2

## 2021-12-10 NOTE — PROGRESS NOTE ADULT - SUBJECTIVE AND OBJECTIVE BOX
INTERVAL HPI/OVERNIGHT EVENTS:  HPI:  22 yo M no known PMHx came with shortness of breadth for 1 day. Pt stated that he was diagnosed with COVID on Nov 27 and was quarantine himself. He was having fevers, cough, N/V, weakness while he was in quarantine but last night he was short of breadth, went to urgent care and was found to be hypoxic and sent to ED. Pt stated that he is not vaccinated , didn't get it because it is new vaccine. No one in his family vaccinated. He was exposed to his sister and friend who turned +ve before him. Pt denied any chest pain, diarrhea, loss of sense of smell or taste.     In Ed: Pt was desaturating to high 80s started on nasal cannula  2lit. D dimer 998, CK >14k AST//302 (07 Dec 2021 13:59)    Patient is a 21y old  Male who presents with a chief complaint of AHRF sec to COVID pneumonia (10 Dec 2021 10:35)    Patient seen at bedside, resting comfortably.   O2sats   93% at rest on 2LNC  88% with ambulation on 2LNC  82% with ambulation and no O2      INTERVAL HPI/OVERNIGHT EVENTS:  T(C): 36.3 (12-10-21 @ 12:18), Max: 36.9 (12-09-21 @ 21:04)  HR: 64 (12-10-21 @ 12:18) (60 - 75)  BP: 110/70 (12-10-21 @ 12:18) (110/70 - 120/83)  RR: 19 (12-10-21 @ 12:18) (18 - 23)  SpO2: 97% (12-10-21 @ 12:18) (86% - 98%)  Wt(kg): --  I&O's Summary    REVIEW OF SYSTEMS: denies fever, chills, SOB, palpitations, chest pain, abdominal pain, nausea, vomitting, diarrhea, constipation, dizziness    MEDICATIONS  (STANDING):  ascorbic acid 500 milliGRAM(s) Oral daily  cholecalciferol 2000 Unit(s) Oral daily  dexAMETHasone  Injectable 6 milliGRAM(s) IV Push daily  enoxaparin Injectable 40 milliGRAM(s) SubCutaneous every 12 hours  famotidine    Tablet 20 milliGRAM(s) Oral daily  fluticasone propionate 50 MICROgram(s)/spray Nasal Spray 1 Spray(s) Both Nostrils two times a day  lactated ringers. 1000 milliLiter(s) (100 mL/Hr) IV Continuous <Continuous>  montelukast 10 milliGRAM(s) Oral at bedtime  remdesivir  IVPB   IV Intermittent   remdesivir  IVPB 100 milliGRAM(s) IV Intermittent every 24 hours  sodium chloride 0.9%. 1000 milliLiter(s) (125 mL/Hr) IV Continuous <Continuous>  sodium chloride 0.9%. 1000 milliLiter(s) (100 mL/Hr) IV Continuous <Continuous>  zinc sulfate 220 milliGRAM(s) Oral daily    MEDICATIONS  (PRN):  acetaminophen     Tablet .. 325 milliGRAM(s) Oral every 4 hours PRN Temp greater or equal to 38C (100.4F)  ALBUTerol    90 MICROgram(s) HFA Inhaler 2 Puff(s) Inhalation every 6 hours PRN Shortness of Breath      PHYSICAL EXAM:  GENERAL: NAD, well-groomed, well-developed  HEAD:  Atraumatic, Normocephalic  EYES: EOMI, PERRLA, conjunctiva and sclera clear  ENMT: No tonsillar erythema, exudates, or enlargement; Moist mucous membranes, Good dentition, No lesions  NECK: Supple, No JVD, Normal thyroid  NERVOUS SYSTEM:  Alert & Oriented X3, Good concentration; Motor Strength 5/5 B/L upper and lower extremities; DTRs 2+ intact and symmetric  CHEST/LUNG: Clear to auscultation bilaterally; No rales, rhonchi, wheezing, or rubs  HEART: Regular rate and rhythm; No murmurs, rubs, or gallops  ABDOMEN: Soft, Nontender, Nondistended; Bowel sounds present  EXTREMITIES:  2+ Peripheral Pulses, No clubbing, cyanosis, or edema  LYMPH: No lymphadenopathy noted  SKIN: No rashes or lesions  LABS:                        13.0   6.71  )-----------( 382      ( 10 Dec 2021 07:04 )             40.4     12-10    140  |  107  |  15  ----------------------------<  112<H>  4.3   |  27  |  1.07    Ca    8.7      10 Dec 2021 07:04    TPro  6.3  /  Alb  2.5<L>  /  TBili  0.6  /  DBili  x   /  AST  128<H>  /  ALT  234<H>  /  AlkPhos  44  12-10        CAPILLARY BLOOD GLUCOSE      MEDICATIONS  (STANDING):  ascorbic acid 500 milliGRAM(s) Oral daily  cholecalciferol 2000 Unit(s) Oral daily  dexAMETHasone  Injectable 6 milliGRAM(s) IV Push daily  enoxaparin Injectable 40 milliGRAM(s) SubCutaneous every 12 hours  famotidine    Tablet 20 milliGRAM(s) Oral daily  fluticasone propionate 50 MICROgram(s)/spray Nasal Spray 1 Spray(s) Both Nostrils two times a day  lactated ringers. 1000 milliLiter(s) (100 mL/Hr) IV Continuous <Continuous>  montelukast 10 milliGRAM(s) Oral at bedtime  remdesivir  IVPB   IV Intermittent   remdesivir  IVPB 100 milliGRAM(s) IV Intermittent every 24 hours  sodium chloride 0.9%. 1000 milliLiter(s) (125 mL/Hr) IV Continuous <Continuous>  sodium chloride 0.9%. 1000 milliLiter(s) (100 mL/Hr) IV Continuous <Continuous>  zinc sulfate 220 milliGRAM(s) Oral daily    MEDICATIONS  (PRN):  acetaminophen     Tablet .. 325 milliGRAM(s) Oral every 4 hours PRN Temp greater or equal to 38C (100.4F)  ALBUTerol    90 MICROgram(s) HFA Inhaler 2 Puff(s) Inhalation every 6 hours PRN Shortness of Breath      Allergies    No Known Allergies    Vital Signs Last 24 Hrs  T(C): 36.3 (10 Dec 2021 12:18), Max: 36.9 (09 Dec 2021 21:04)  T(F): 97.3 (10 Dec 2021 12:18), Max: 98.5 (09 Dec 2021 21:04)  HR: 64 (10 Dec 2021 12:18) (60 - 75)  BP: 110/70 (10 Dec 2021 12:18) (110/70 - 120/83)  BP(mean): --  RR: 19 (10 Dec 2021 12:18) (18 - 23)  SpO2: 97% (10 Dec 2021 12:18) (86% - 98%)      LABS:                        13.0   6.71  )-----------( 382      ( 10 Dec 2021 07:04 )             40.4     12-10    140  |  107  |  15  ----------------------------<  112<H>  4.3   |  27  |  1.07    Ca    8.7      10 Dec 2021 07:04    TPro  6.3  /  Alb  2.5<L>  /  TBili  0.6  /  DBili  x   /  AST  128<H>  /  ALT  234<H>  /  AlkPhos  44  12-10

## 2021-12-10 NOTE — PROGRESS NOTE ADULT - PROBLEM SELECTOR PLAN 1
C/w Remdesivir D4/5  C/w Dexamethasone D4/10  C/w oxygen supp  Maintain isolation precautions  Monitor oxygen sat  Prone as tolerated  Monitor LFTs, LDH, CRP, D-Dimer, Ferritin, procalcitonin  Lovenox 40mg bid BMI >30

## 2021-12-10 NOTE — PROGRESS NOTE ADULT - PROBLEM SELECTOR PLAN 4
S/p IVF  Avoid hepatotoxins   Monitor CK  Discontinue remdesivir if ALT elevation is accompanied by signs or symptoms of liver inflammation or increasing conjugated bilirubin, alkaline phosphatase, or PT. Avoid hepatotoxins   Monitor CK  Discontinue remdesivir if ALT elevation is accompanied by signs or symptoms of liver inflammation or increasing conjugated bilirubin, alkaline phosphatase, or PT.

## 2021-12-10 NOTE — PROGRESS NOTE ADULT - SUBJECTIVE AND OBJECTIVE BOX
Patient is a 21y old  Male who presents with a chief complaint of AHRF sec to COVID pneumonia (09 Dec 2021 17:42)      pt seen in icu [  ], reg med floor [ x  ], bed [ x ], chair at bedside [   ], a+o x3 [x  ], lethargic [  ],    nad [ x ]        Allergies    No Known Allergies        Vitals    T(F): 98.4 (12-10-21 @ 05:03), Max: 98.5 (12-09-21 @ 21:04)  HR: 66 (12-10-21 @ 05:03) (66 - 75)  BP: 119/71 (12-10-21 @ 05:03) (119/71 - 124/69)  RR: 18 (12-10-21 @ 05:03) (18 - 23)  SpO2: 92% (12-10-21 @ 05:03) (86% - 97%)  Wt(kg): --  CAPILLARY BLOOD GLUCOSE          Labs                          13.3   7.11  )-----------( 336      ( 09 Dec 2021 11:52 )             41.0       12-09    141  |  108  |  12  ----------------------------<  135<H>  4.3   |  29  |  1.05    Ca    9.0      09 Dec 2021 11:52  Phos  3.9     12-08  Mg     2.3     12-08    TPro  6.9  /  Alb  2.5<L>  /  TBili  0.5  /  DBili  x   /  AST  177<H>  /  ALT  230<H>  /  AlkPhos  42  12-09      CARDIAC MARKERS ( 09 Dec 2021 11:52 )  x     / x     / 4420 U/L / x     / x      CARDIAC MARKERS ( 08 Dec 2021 06:49 )  x     / x     / 20538 U/L / x     / x          Troponin I, High Sensitivity Result: 41.2 ng/L (12-07-21 @ 11:11)        Radiology Results      Meds    MEDICATIONS  (STANDING):  ascorbic acid 500 milliGRAM(s) Oral daily  cholecalciferol 2000 Unit(s) Oral daily  dexAMETHasone  Injectable 6 milliGRAM(s) IV Push daily  enoxaparin Injectable 40 milliGRAM(s) SubCutaneous every 12 hours  famotidine    Tablet 20 milliGRAM(s) Oral daily  fluticasone propionate 50 MICROgram(s)/spray Nasal Spray 1 Spray(s) Both Nostrils two times a day  lactated ringers. 1000 milliLiter(s) (100 mL/Hr) IV Continuous <Continuous>  montelukast 10 milliGRAM(s) Oral at bedtime  remdesivir  IVPB   IV Intermittent   remdesivir  IVPB 100 milliGRAM(s) IV Intermittent every 24 hours  sodium chloride 0.9%. 1000 milliLiter(s) (125 mL/Hr) IV Continuous <Continuous>  sodium chloride 0.9%. 1000 milliLiter(s) (100 mL/Hr) IV Continuous <Continuous>  zinc sulfate 220 milliGRAM(s) Oral daily      MEDICATIONS  (PRN):  acetaminophen     Tablet .. 325 milliGRAM(s) Oral every 4 hours PRN Temp greater or equal to 38C (100.4F)  ALBUTerol    90 MICROgram(s) HFA Inhaler 2 Puff(s) Inhalation every 6 hours PRN Shortness of Breath      Physical Exam    Neuro :  no focal deficits  Respiratory: CTA B/L  CV: RRR, S1S2, no murmurs,   Abdominal: Soft, NT, ND +BS,  Extremities: No edema, + peripheral pulses      ASSESSMENT    sepsis,   pneumonia 2nd to covid 19,   rhabdomyolysis,   transaminitis,   unvaccinated         PLAN    cont decadron and remdesevir,   vit c, vitamin d, zinc, pepcid, singulair,   contact and airborne isolation,   pulm f/u   cont albuterol inhaler,   fluticasone nasal spray bid   cont tylenol prn,   robitussin prn,   lovenox,   O2 sat 94% (86% - 96%) n/c 2 L  O2 via nasal canula,   covid-19 nucleocapsid antibody test positive noted  ,   procalcitonin, D-dimer, crp, ldh, ferritin, noted    cont dvt prophylaxis   cont ivf  ck improving  cont current meds             Patient is a 21y old  Male who presents with a chief complaint of AHRF sec to COVID pneumonia (09 Dec 2021 17:42)      pt seen in icu [  ], reg med floor [ x  ], bed [ x ], chair at bedside [   ], a+o x3 [x  ], lethargic [  ],    nad [ x ]        Allergies    No Known Allergies        Vitals    T(F): 98.4 (12-10-21 @ 05:03), Max: 98.5 (12-09-21 @ 21:04)  HR: 66 (12-10-21 @ 05:03) (66 - 75)  BP: 119/71 (12-10-21 @ 05:03) (119/71 - 124/69)  RR: 18 (12-10-21 @ 05:03) (18 - 23)  SpO2: 92% (12-10-21 @ 05:03) (86% - 97%)  Wt(kg): --  CAPILLARY BLOOD GLUCOSE          Labs                          13.3   7.11  )-----------( 336      ( 09 Dec 2021 11:52 )             41.0       12-09    141  |  108  |  12  ----------------------------<  135<H>  4.3   |  29  |  1.05    Ca    9.0      09 Dec 2021 11:52  Phos  3.9     12-08  Mg     2.3     12-08    TPro  6.9  /  Alb  2.5<L>  /  TBili  0.5  /  DBili  x   /  AST  177<H>  /  ALT  230<H>  /  AlkPhos  42  12-09      CARDIAC MARKERS ( 09 Dec 2021 11:52 )  x     / x     / 4420 U/L / x     / x      CARDIAC MARKERS ( 08 Dec 2021 06:49 )  x     / x     / 75963 U/L / x     / x          Troponin I, High Sensitivity Result: 41.2 ng/L (12-07-21 @ 11:11)        Radiology Results      Meds    MEDICATIONS  (STANDING):  ascorbic acid 500 milliGRAM(s) Oral daily  cholecalciferol 2000 Unit(s) Oral daily  dexAMETHasone  Injectable 6 milliGRAM(s) IV Push daily  enoxaparin Injectable 40 milliGRAM(s) SubCutaneous every 12 hours  famotidine    Tablet 20 milliGRAM(s) Oral daily  fluticasone propionate 50 MICROgram(s)/spray Nasal Spray 1 Spray(s) Both Nostrils two times a day  lactated ringers. 1000 milliLiter(s) (100 mL/Hr) IV Continuous <Continuous>  montelukast 10 milliGRAM(s) Oral at bedtime  remdesivir  IVPB   IV Intermittent   remdesivir  IVPB 100 milliGRAM(s) IV Intermittent every 24 hours  sodium chloride 0.9%. 1000 milliLiter(s) (125 mL/Hr) IV Continuous <Continuous>  sodium chloride 0.9%. 1000 milliLiter(s) (100 mL/Hr) IV Continuous <Continuous>  zinc sulfate 220 milliGRAM(s) Oral daily      MEDICATIONS  (PRN):  acetaminophen     Tablet .. 325 milliGRAM(s) Oral every 4 hours PRN Temp greater or equal to 38C (100.4F)  ALBUTerol    90 MICROgram(s) HFA Inhaler 2 Puff(s) Inhalation every 6 hours PRN Shortness of Breath      Physical Exam    Neuro :  no focal deficits  Respiratory: CTA B/L  CV: RRR, S1S2, no murmurs,   Abdominal: Soft, NT, ND +BS,  Extremities: No edema, + peripheral pulses      ASSESSMENT    sepsis,   pneumonia 2nd to covid 19,   rhabdomyolysis,   transaminitis,   unvaccinated         PLAN    cont decadron and remdesevir,   vit c, vitamin d, zinc, pepcid, singulair,   contact and airborne isolation,   pulm f/u   cont albuterol inhaler,   fluticasone nasal spray bid   afebrile  cont tylenol prn,   robitussin prn,   lovenox,   O2 sat 92%  (86% - 97%) n/c 2 L   O2 sat 86% with ambulation  cont O2 via nasal canula and taper as tolerated,   pt may benefit from home O2  covid-19 nucleocapsid antibody test positive noted  ,   procalcitonin, D-dimer, crp, ldh, ferritin, noted    cont dvt prophylaxis   cont ivf  ck improving  cont current meds

## 2021-12-11 ENCOUNTER — TRANSCRIPTION ENCOUNTER (OUTPATIENT)
Age: 21
End: 2021-12-11

## 2021-12-11 VITALS
SYSTOLIC BLOOD PRESSURE: 126 MMHG | DIASTOLIC BLOOD PRESSURE: 78 MMHG | OXYGEN SATURATION: 97 % | HEART RATE: 86 BPM | TEMPERATURE: 98 F | RESPIRATION RATE: 17 BRPM

## 2021-12-11 LAB
ANION GAP SERPL CALC-SCNC: 7 MMOL/L — SIGNIFICANT CHANGE UP (ref 5–17)
BUN SERPL-MCNC: 17 MG/DL — SIGNIFICANT CHANGE UP (ref 7–18)
CALCIUM SERPL-MCNC: 8.9 MG/DL — SIGNIFICANT CHANGE UP (ref 8.4–10.5)
CHLORIDE SERPL-SCNC: 106 MMOL/L — SIGNIFICANT CHANGE UP (ref 96–108)
CK SERPL-CCNC: 573 U/L — HIGH (ref 35–232)
CO2 SERPL-SCNC: 27 MMOL/L — SIGNIFICANT CHANGE UP (ref 22–31)
CREAT SERPL-MCNC: 1.08 MG/DL — SIGNIFICANT CHANGE UP (ref 0.5–1.3)
D DIMER BLD IA.RAPID-MCNC: 2306 NG/ML DDU — HIGH
GLUCOSE SERPL-MCNC: 98 MG/DL — SIGNIFICANT CHANGE UP (ref 70–99)
HCT VFR BLD CALC: 41.6 % — SIGNIFICANT CHANGE UP (ref 39–50)
HGB BLD-MCNC: 13.5 G/DL — SIGNIFICANT CHANGE UP (ref 13–17)
MCHC RBC-ENTMCNC: 23.7 PG — LOW (ref 27–34)
MCHC RBC-ENTMCNC: 32.5 GM/DL — SIGNIFICANT CHANGE UP (ref 32–36)
MCV RBC AUTO: 73.1 FL — LOW (ref 80–100)
NRBC # BLD: 0 /100 WBCS — SIGNIFICANT CHANGE UP (ref 0–0)
PLATELET # BLD AUTO: 448 K/UL — HIGH (ref 150–400)
POTASSIUM SERPL-MCNC: 3.9 MMOL/L — SIGNIFICANT CHANGE UP (ref 3.5–5.3)
POTASSIUM SERPL-SCNC: 3.9 MMOL/L — SIGNIFICANT CHANGE UP (ref 3.5–5.3)
RBC # BLD: 5.69 M/UL — SIGNIFICANT CHANGE UP (ref 4.2–5.8)
RBC # FLD: 13.5 % — SIGNIFICANT CHANGE UP (ref 10.3–14.5)
SODIUM SERPL-SCNC: 140 MMOL/L — SIGNIFICANT CHANGE UP (ref 135–145)
WBC # BLD: 10.53 K/UL — HIGH (ref 3.8–10.5)
WBC # FLD AUTO: 10.53 K/UL — HIGH (ref 3.8–10.5)

## 2021-12-11 PROCEDURE — 85610 PROTHROMBIN TIME: CPT

## 2021-12-11 PROCEDURE — 94640 AIRWAY INHALATION TREATMENT: CPT

## 2021-12-11 PROCEDURE — 83605 ASSAY OF LACTIC ACID: CPT

## 2021-12-11 PROCEDURE — 85379 FIBRIN DEGRADATION QUANT: CPT

## 2021-12-11 PROCEDURE — 82728 ASSAY OF FERRITIN: CPT

## 2021-12-11 PROCEDURE — 96374 THER/PROPH/DIAG INJ IV PUSH: CPT

## 2021-12-11 PROCEDURE — 87635 SARS-COV-2 COVID-19 AMP PRB: CPT

## 2021-12-11 PROCEDURE — 80053 COMPREHEN METABOLIC PANEL: CPT

## 2021-12-11 PROCEDURE — 99291 CRITICAL CARE FIRST HOUR: CPT

## 2021-12-11 PROCEDURE — 85652 RBC SED RATE AUTOMATED: CPT

## 2021-12-11 PROCEDURE — 85025 COMPLETE CBC W/AUTO DIFF WBC: CPT

## 2021-12-11 PROCEDURE — 93005 ELECTROCARDIOGRAM TRACING: CPT

## 2021-12-11 PROCEDURE — 36415 COLL VENOUS BLD VENIPUNCTURE: CPT

## 2021-12-11 PROCEDURE — 80048 BASIC METABOLIC PNL TOTAL CA: CPT

## 2021-12-11 PROCEDURE — 82550 ASSAY OF CK (CPK): CPT

## 2021-12-11 PROCEDURE — 71045 X-RAY EXAM CHEST 1 VIEW: CPT

## 2021-12-11 PROCEDURE — 80061 LIPID PANEL: CPT

## 2021-12-11 PROCEDURE — 84145 PROCALCITONIN (PCT): CPT

## 2021-12-11 PROCEDURE — 83735 ASSAY OF MAGNESIUM: CPT

## 2021-12-11 PROCEDURE — 86140 C-REACTIVE PROTEIN: CPT

## 2021-12-11 PROCEDURE — 83615 LACTATE (LD) (LDH) ENZYME: CPT

## 2021-12-11 PROCEDURE — 84484 ASSAY OF TROPONIN QUANT: CPT

## 2021-12-11 PROCEDURE — 84100 ASSAY OF PHOSPHORUS: CPT

## 2021-12-11 PROCEDURE — 86769 SARS-COV-2 COVID-19 ANTIBODY: CPT

## 2021-12-11 PROCEDURE — 83036 HEMOGLOBIN GLYCOSYLATED A1C: CPT

## 2021-12-11 PROCEDURE — 85027 COMPLETE CBC AUTOMATED: CPT

## 2021-12-11 RX ORDER — ACETAMINOPHEN 500 MG
1 TABLET ORAL
Qty: 0 | Refills: 0 | DISCHARGE
Start: 2021-12-11

## 2021-12-11 RX ORDER — ASCORBIC ACID 60 MG
1 TABLET,CHEWABLE ORAL
Qty: 0 | Refills: 0 | DISCHARGE
Start: 2021-12-11

## 2021-12-11 RX ORDER — MONTELUKAST 4 MG/1
1 TABLET, CHEWABLE ORAL
Qty: 30 | Refills: 0
Start: 2021-12-11 | End: 2022-01-09

## 2021-12-11 RX ORDER — DEXAMETHASONE 0.5 MG/5ML
1 ELIXIR ORAL
Qty: 14 | Refills: 0
Start: 2021-12-11 | End: 2021-12-17

## 2021-12-11 RX ORDER — FAMOTIDINE 10 MG/ML
1 INJECTION INTRAVENOUS
Qty: 30 | Refills: 0
Start: 2021-12-11 | End: 2022-01-09

## 2021-12-11 RX ORDER — ALBUTEROL 90 UG/1
2 AEROSOL, METERED ORAL
Qty: 0 | Refills: 0 | DISCHARGE
Start: 2021-12-11

## 2021-12-11 RX ORDER — ALBUTEROL 90 UG/1
2 AEROSOL, METERED ORAL
Qty: 56 | Refills: 0
Start: 2021-12-11 | End: 2021-12-17

## 2021-12-11 RX ORDER — FAMOTIDINE 10 MG/ML
1 INJECTION INTRAVENOUS
Qty: 0 | Refills: 0 | DISCHARGE
Start: 2021-12-11

## 2021-12-11 RX ORDER — RIVAROXABAN 15 MG-20MG
1 KIT ORAL
Qty: 30 | Refills: 0
Start: 2021-12-11 | End: 2022-01-09

## 2021-12-11 RX ORDER — FLUTICASONE PROPIONATE 50 MCG
1 SPRAY, SUSPENSION NASAL
Qty: 0 | Refills: 0 | DISCHARGE
Start: 2021-12-11

## 2021-12-11 RX ORDER — ALBUTEROL 90 UG/1
2 AEROSOL, METERED ORAL
Qty: 1 | Refills: 0
Start: 2021-12-11

## 2021-12-11 RX ORDER — FLUTICASONE PROPIONATE 50 MCG
1 SPRAY, SUSPENSION NASAL
Qty: 1 | Refills: 0
Start: 2021-12-11 | End: 2022-01-09

## 2021-12-11 RX ORDER — ZINC SULFATE TAB 220 MG (50 MG ZINC EQUIVALENT) 220 (50 ZN) MG
1 TAB ORAL
Qty: 0 | Refills: 0 | DISCHARGE
Start: 2021-12-11

## 2021-12-11 RX ORDER — FAMOTIDINE 10 MG/ML
1 INJECTION INTRAVENOUS
Qty: 7 | Refills: 0
Start: 2021-12-11 | End: 2021-12-17

## 2021-12-11 RX ORDER — FLUTICASONE PROPIONATE 50 MCG
1 SPRAY, SUSPENSION NASAL
Qty: 14 | Refills: 0
Start: 2021-12-11 | End: 2021-12-17

## 2021-12-11 RX ORDER — MONTELUKAST 4 MG/1
1 TABLET, CHEWABLE ORAL
Qty: 0 | Refills: 0 | DISCHARGE
Start: 2021-12-11

## 2021-12-11 RX ADMIN — Medication 1 SPRAY(S): at 17:25

## 2021-12-11 RX ADMIN — FAMOTIDINE 20 MILLIGRAM(S): 10 INJECTION INTRAVENOUS at 11:13

## 2021-12-11 RX ADMIN — Medication 1 SPRAY(S): at 06:06

## 2021-12-11 RX ADMIN — Medication 2000 UNIT(S): at 11:13

## 2021-12-11 RX ADMIN — ALBUTEROL 2 PUFF(S): 90 AEROSOL, METERED ORAL at 10:38

## 2021-12-11 RX ADMIN — Medication 500 MILLIGRAM(S): at 11:13

## 2021-12-11 RX ADMIN — Medication 6 MILLIGRAM(S): at 06:06

## 2021-12-11 RX ADMIN — ENOXAPARIN SODIUM 40 MILLIGRAM(S): 100 INJECTION SUBCUTANEOUS at 17:25

## 2021-12-11 RX ADMIN — REMDESIVIR 500 MILLIGRAM(S): 5 INJECTION INTRAVENOUS at 14:20

## 2021-12-11 RX ADMIN — ZINC SULFATE TAB 220 MG (50 MG ZINC EQUIVALENT) 220 MILLIGRAM(S): 220 (50 ZN) TAB at 11:13

## 2021-12-11 RX ADMIN — ENOXAPARIN SODIUM 40 MILLIGRAM(S): 100 INJECTION SUBCUTANEOUS at 06:06

## 2021-12-11 NOTE — PROGRESS NOTE ADULT - SUBJECTIVE AND OBJECTIVE BOX
Patient is a 21y old  Male who presents with a chief complaint of AHRF sec to COVID pneumonia (10 Dec 2021 14:18)    pt seen in icu [  ], reg med floor [ x  ], bed [ x ], chair at bedside [   ], a+o x3 [x  ], lethargic [  ],    nad [ x ]      Allergies    No Known Allergies        Vitals    T(F): 97.9 (12-11-21 @ 05:09), Max: 98.6 (12-10-21 @ 21:45)  HR: 86 (12-11-21 @ 05:09) (64 - 86)  BP: 120/69 (12-11-21 @ 05:09) (107/54 - 120/69)  RR: 18 (12-11-21 @ 05:09) (18 - 19)  SpO2: 96% (12-11-21 @ 05:09) (81% - 100%)  Wt(kg): --  CAPILLARY BLOOD GLUCOSE          Labs                          13.0   6.71  )-----------( 382      ( 10 Dec 2021 07:04 )             40.4       12-10    140  |  107  |  15  ----------------------------<  112<H>  4.3   |  27  |  1.07    Ca    8.7      10 Dec 2021 07:04    TPro  6.3  /  Alb  2.5<L>  /  TBili  0.6  /  DBili  x   /  AST  128<H>  /  ALT  234<H>  /  AlkPhos  44  12-10      CARDIAC MARKERS ( 10 Dec 2021 07:04 )  x     / x     / 1604 U/L / x     / x      CARDIAC MARKERS ( 09 Dec 2021 11:52 )  x     / x     / 4420 U/L / x     / x                Radiology Results      Meds    MEDICATIONS  (STANDING):  ascorbic acid 500 milliGRAM(s) Oral daily  cholecalciferol 2000 Unit(s) Oral daily  dexAMETHasone  Injectable 6 milliGRAM(s) IV Push daily  enoxaparin Injectable 40 milliGRAM(s) SubCutaneous every 12 hours  famotidine    Tablet 20 milliGRAM(s) Oral daily  fluticasone propionate 50 MICROgram(s)/spray Nasal Spray 1 Spray(s) Both Nostrils two times a day  montelukast 10 milliGRAM(s) Oral at bedtime  remdesivir  IVPB   IV Intermittent   remdesivir  IVPB 100 milliGRAM(s) IV Intermittent every 24 hours  zinc sulfate 220 milliGRAM(s) Oral daily      MEDICATIONS  (PRN):  acetaminophen     Tablet .. 325 milliGRAM(s) Oral every 4 hours PRN Temp greater or equal to 38C (100.4F)  ALBUTerol    90 MICROgram(s) HFA Inhaler 2 Puff(s) Inhalation every 6 hours PRN Shortness of Breath      Physical Exam    Neuro :  no focal deficits  Respiratory: CTA B/L  CV: RRR, S1S2, no murmurs,   Abdominal: Soft, NT, ND +BS,  Extremities: No edema, + peripheral pulses      ASSESSMENT    sepsis,   pneumonia 2nd to covid 19,   rhabdomyolysis,   transaminitis,   unvaccinated         PLAN    cont decadron and remdesevir,   vit c, vitamin d, zinc, pepcid, singulair,   contact and airborne isolation,   pulm f/u   cont albuterol inhaler,   fluticasone nasal spray bid   afebrile  cont tylenol prn,   robitussin prn,   lovenox,   O2 sat 92%  (86% - 97%) n/c 2 L   O2 sat 86% with ambulation  cont O2 via nasal canula and taper as tolerated,   pt may benefit from home O2  covid-19 nucleocapsid antibody test positive noted  ,   procalcitonin, D-dimer, crp, ldh, ferritin, noted    cont dvt prophylaxis   cont ivf  ck improving  cont current meds         Patient is a 21y old  Male who presents with a chief complaint of AHRF sec to COVID pneumonia (10 Dec 2021 14:18)    pt seen in icu [  ], reg med floor [ x  ], bed [ x ], chair at bedside [   ], a+o x3 [x  ], lethargic [  ],    nad [ x ]      Allergies    No Known Allergies        Vitals    T(F): 97.9 (12-11-21 @ 05:09), Max: 98.6 (12-10-21 @ 21:45)  HR: 86 (12-11-21 @ 05:09) (64 - 86)  BP: 120/69 (12-11-21 @ 05:09) (107/54 - 120/69)  RR: 18 (12-11-21 @ 05:09) (18 - 19)  SpO2: 96% (12-11-21 @ 05:09) (81% - 100%)  Wt(kg): --  CAPILLARY BLOOD GLUCOSE          Labs                          13.0   6.71  )-----------( 382      ( 10 Dec 2021 07:04 )             40.4       12-10    140  |  107  |  15  ----------------------------<  112<H>  4.3   |  27  |  1.07    Ca    8.7      10 Dec 2021 07:04    TPro  6.3  /  Alb  2.5<L>  /  TBili  0.6  /  DBili  x   /  AST  128<H>  /  ALT  234<H>  /  AlkPhos  44  12-10      CARDIAC MARKERS ( 10 Dec 2021 07:04 )  x     / x     / 1604 U/L / x     / x      CARDIAC MARKERS ( 09 Dec 2021 11:52 )  x     / x     / 4420 U/L / x     / x                Radiology Results      Meds    MEDICATIONS  (STANDING):  ascorbic acid 500 milliGRAM(s) Oral daily  cholecalciferol 2000 Unit(s) Oral daily  dexAMETHasone  Injectable 6 milliGRAM(s) IV Push daily  enoxaparin Injectable 40 milliGRAM(s) SubCutaneous every 12 hours  famotidine    Tablet 20 milliGRAM(s) Oral daily  fluticasone propionate 50 MICROgram(s)/spray Nasal Spray 1 Spray(s) Both Nostrils two times a day  montelukast 10 milliGRAM(s) Oral at bedtime  remdesivir  IVPB   IV Intermittent   remdesivir  IVPB 100 milliGRAM(s) IV Intermittent every 24 hours  zinc sulfate 220 milliGRAM(s) Oral daily      MEDICATIONS  (PRN):  acetaminophen     Tablet .. 325 milliGRAM(s) Oral every 4 hours PRN Temp greater or equal to 38C (100.4F)  ALBUTerol    90 MICROgram(s) HFA Inhaler 2 Puff(s) Inhalation every 6 hours PRN Shortness of Breath      Physical Exam    Neuro :  no focal deficits  Respiratory: CTA B/L  CV: RRR, S1S2, no murmurs,   Abdominal: Soft, NT, ND +BS,  Extremities: No edema, + peripheral pulses      ASSESSMENT    sepsis,   pneumonia 2nd to covid 19,   rhabdomyolysis,   transaminitis,   unvaccinated         PLAN    cont decadron and remdesevir,   vit c, vitamin d, zinc, pepcid, singulair,   contact and airborne isolation,   pulm f/u   cont albuterol inhaler,   fluticasone nasal spray bid   afebrile  cont tylenol prn,   robitussin prn,   lovenox,   O2 sat  96% (81% - 100%) n/c 2 L   O2 sat 86% with ambulation  cont O2 via nasal canula and taper as tolerated,   pt may benefit from home O2  covid-19 nucleocapsid antibody test positive noted  ,   procalcitonin, D-dimer, crp, ldh, ferritin, noted    cont dvt prophylaxis   cont ivf  encourage po fluids   ck improving  cont current meds  d/c plan pending delivery of home O2

## 2021-12-11 NOTE — DISCHARGE NOTE NURSING/CASE MANAGEMENT/SOCIAL WORK - NSDCPEFALRISK_GEN_ALL_CORE
For information on Fall & Injury Prevention, visit: [email or web address] OR  [email or web address] OR  https://www.cdc.gov/steadi/patient.html

## 2021-12-11 NOTE — DISCHARGE NOTE NURSING/CASE MANAGEMENT/SOCIAL WORK - PATIENT PORTAL LINK FT
You can access the FollowMyHealth Patient Portal offered by [facility] by registering at the following website: [email or web address]. By joining [employer]’s FollowMyHealth portal, you will also be able to view your health information using other applications (apps) compatible with our system.

## 2021-12-11 NOTE — PROGRESS NOTE ADULT - SUBJECTIVE AND OBJECTIVE BOX
Patient is a 21y old  Male who presents with a chief complaint of AHRF sec to COVID pneumonia (11 Dec 2021 06:19)  Awake, alert, comfortable in bed in NAD. Feeling better. Oxygen sa 96% on RA    INTERVAL HPI/OVERNIGHT EVENTS:      VITAL SIGNS:  T(F): 97.9 (12-11-21 @ 05:09)  HR: 77 (12-11-21 @ 06:19)  BP: 120/69 (12-11-21 @ 05:09)  RR: 16 (12-11-21 @ 06:19)  SpO2: 100% (12-11-21 @ 10:43)  Wt(kg): --  I&O's Detail          REVIEW OF SYSTEMS:    CONSTITUTIONAL:  No fevers, chills, sweats    HEENT:  Eyes:  No diplopia or blurred vision. ENT:  No earache, sore throat or runny nose.    CARDIOVASCULAR:  No pressure, squeezing, tightness, or heaviness about the chest; no palpitations.    RESPIRATORY:  Per HPI    GASTROINTESTINAL:  No abdominal pain, nausea, vomiting or diarrhea.    GENITOURINARY:  No dysuria, frequency or urgency.    NEUROLOGIC:  No paresthesias, fasciculations, seizures or weakness.    PSYCHIATRIC:  No disorder of thought or mood.      PHYSICAL EXAM:    Constitutional: Well developed and nourished  Eyes:Perrla  ENMT: normal  Neck:supple  Respiratory: good air entry  Cardiovascular: S1 S2 regular  Gastrointestinal: Soft, Non tender  Extremities: No edema  Vascular:normal  Neurological:Awake, alert,Ox3  Musculoskeletal:Normal      MEDICATIONS  (STANDING):  ascorbic acid 500 milliGRAM(s) Oral daily  cholecalciferol 2000 Unit(s) Oral daily  dexAMETHasone  Injectable 6 milliGRAM(s) IV Push daily  enoxaparin Injectable 40 milliGRAM(s) SubCutaneous every 12 hours  famotidine    Tablet 20 milliGRAM(s) Oral daily  fluticasone propionate 50 MICROgram(s)/spray Nasal Spray 1 Spray(s) Both Nostrils two times a day  montelukast 10 milliGRAM(s) Oral at bedtime  remdesivir  IVPB   IV Intermittent   remdesivir  IVPB 100 milliGRAM(s) IV Intermittent every 24 hours  zinc sulfate 220 milliGRAM(s) Oral daily    MEDICATIONS  (PRN):  acetaminophen     Tablet .. 325 milliGRAM(s) Oral every 4 hours PRN Temp greater or equal to 38C (100.4F)  ALBUTerol    90 MICROgram(s) HFA Inhaler 2 Puff(s) Inhalation every 6 hours PRN Shortness of Breath      Allergies    No Known Allergies    Intolerances        LABS:                        13.5   10.53 )-----------( 448      ( 11 Dec 2021 07:16 )             41.6     12-11    140  |  106  |  17  ----------------------------<  98  3.9   |  27  |  1.08    Ca    8.9      11 Dec 2021 07:16    TPro  6.3  /  Alb  2.5<L>  /  TBili  0.6  /  DBili  x   /  AST  128<H>  /  ALT  234<H>  /  AlkPhos  44  12-10          CARDIAC MARKERS ( 11 Dec 2021 07:16 )  x     / x     / 573 U/L / x     / x      CARDIAC MARKERS ( 10 Dec 2021 07:04 )  x     / x     / 1604 U/L / x     / x      CARDIAC MARKERS ( 09 Dec 2021 11:52 )  x     / x     / 4420 U/L / x     / x          CAPILLARY BLOOD GLUCOSE        pro-bnp -- 12-11 @ 07:16     d-dimer 2306  12-11 @ 07:16  pro-bnp -- 12-09 @ 11:52     d-dimer 790  12-09 @ 11:52  pro-bnp -- 12-08 @ 06:49     d-dimer 1263  12-08 @ 06:49  pro-bnp -- 12-07 @ 11:11     d-dimer 998  12-07 @ 11:11      RADIOLOGY & ADDITIONAL TESTS:    CXR:    Ct scan chest:    ekg;    echo:

## 2021-12-11 NOTE — PROGRESS NOTE ADULT - REASON FOR ADMISSION
AHRF sec to COVID pneumonia

## 2021-12-11 NOTE — PROGRESS NOTE ADULT - PROVIDER SPECIALTY LIST ADULT
Internal Medicine
Pulmonology
Internal Medicine
Internal Medicine

## 2021-12-11 NOTE — PROGRESS NOTE ADULT - PROBLEM SELECTOR PROBLEM 2
Acute respiratory failure with hypoxia
Acute respiratory failure with hypoxia
Rhabdomyolysis
Acute respiratory failure with hypoxia

## 2021-12-11 NOTE — PROGRESS NOTE ADULT - PROBLEM SELECTOR PROBLEM 1
Pneumonia due to COVID-19 virus
Acute respiratory failure with hypoxia
Pneumonia due to COVID-19 virus

## 2021-12-11 NOTE — CHART NOTE - NSCHARTNOTEFT_GEN_A_CORE
Paged about a phone call from patient's mother, stating that a discharge medication has had a back order.    Called back to find out what medication is back ordered, in order to evaluate for alternatives  Called [phone], however, no answer and mailbox is full  Primary team to call in the AM

## 2021-12-12 ENCOUNTER — TRANSCRIPTION ENCOUNTER (OUTPATIENT)
Age: 21
End: 2021-12-12

## 2021-12-12 RX ORDER — DEXAMETHASONE 0.5 MG/5ML
1 ELIXIR ORAL
Qty: 14 | Refills: 0
Start: 2021-12-12 | End: 2021-12-18

## 2021-12-12 RX ORDER — RIVAROXABAN 15 MG-20MG
1 KIT ORAL
Qty: 30 | Refills: 0
Start: 2021-12-12 | End: 2022-01-10

## 2021-12-12 RX ORDER — ZINC SULFATE TAB 220 MG (50 MG ZINC EQUIVALENT) 220 (50 ZN) MG
1 TAB ORAL
Qty: 30 | Refills: 0
Start: 2021-12-12 | End: 2022-01-10

## 2021-12-12 RX ORDER — ALBUTEROL 90 UG/1
2 AEROSOL, METERED ORAL
Qty: 240 | Refills: 0
Start: 2021-12-12 | End: 2022-01-10

## 2021-12-12 RX ORDER — MONTELUKAST 4 MG/1
1 TABLET, CHEWABLE ORAL
Qty: 30 | Refills: 0
Start: 2021-12-12 | End: 2022-01-10

## 2021-12-12 RX ORDER — FAMOTIDINE 10 MG/ML
1 INJECTION INTRAVENOUS
Qty: 30 | Refills: 0
Start: 2021-12-12 | End: 2022-01-10

## 2021-12-12 RX ORDER — ASCORBIC ACID 60 MG
1 TABLET,CHEWABLE ORAL
Qty: 30 | Refills: 0
Start: 2021-12-12 | End: 2022-01-10

## 2021-12-14 ENCOUNTER — TRANSCRIPTION ENCOUNTER (OUTPATIENT)
Age: 21
End: 2021-12-14

## 2022-04-22 ENCOUNTER — NON-APPOINTMENT (OUTPATIENT)
Age: 22
End: 2022-04-22

## 2022-04-26 ENCOUNTER — APPOINTMENT (OUTPATIENT)
Dept: PULMONOLOGY | Facility: CLINIC | Age: 22
End: 2022-04-26
Payer: COMMERCIAL

## 2022-04-26 VITALS
SYSTOLIC BLOOD PRESSURE: 120 MMHG | DIASTOLIC BLOOD PRESSURE: 82 MMHG | HEIGHT: 66 IN | OXYGEN SATURATION: 96 % | HEART RATE: 91 BPM | WEIGHT: 228 LBS | BODY MASS INDEX: 36.64 KG/M2 | TEMPERATURE: 96.2 F

## 2022-04-26 DIAGNOSIS — Z78.9 OTHER SPECIFIED HEALTH STATUS: ICD-10-CM

## 2022-04-26 DIAGNOSIS — U09.9 POST COVID-19 CONDITION, UNSPECIFIED: ICD-10-CM

## 2022-04-26 DIAGNOSIS — J45.909 UNSPECIFIED ASTHMA, UNCOMPLICATED: ICD-10-CM

## 2022-04-26 PROBLEM — Z00.00 ENCOUNTER FOR PREVENTIVE HEALTH EXAMINATION: Status: ACTIVE | Noted: 2022-04-26

## 2022-04-26 PROCEDURE — 99203 OFFICE O/P NEW LOW 30 MIN: CPT

## 2022-05-01 ENCOUNTER — TRANSCRIPTION ENCOUNTER (OUTPATIENT)
Age: 22
End: 2022-05-01

## 2022-05-19 ENCOUNTER — APPOINTMENT (OUTPATIENT)
Dept: PULMONOLOGY | Facility: CLINIC | Age: 22
End: 2022-05-19
Payer: COMMERCIAL

## 2022-05-19 VITALS
WEIGHT: 223 LBS | HEART RATE: 105 BPM | DIASTOLIC BLOOD PRESSURE: 70 MMHG | OXYGEN SATURATION: 97 % | SYSTOLIC BLOOD PRESSURE: 126 MMHG | TEMPERATURE: 97.3 F

## 2022-05-19 DIAGNOSIS — J98.4 OTHER DISORDERS OF LUNG: ICD-10-CM

## 2022-05-19 DIAGNOSIS — Z78.9 OTHER SPECIFIED HEALTH STATUS: ICD-10-CM

## 2022-05-19 DIAGNOSIS — U07.1 COVID-19: ICD-10-CM

## 2022-05-19 PROCEDURE — 94729 DIFFUSING CAPACITY: CPT

## 2022-05-19 PROCEDURE — 99214 OFFICE O/P EST MOD 30 MIN: CPT | Mod: 25

## 2022-05-19 PROCEDURE — 94727 GAS DIL/WSHOT DETER LNG VOL: CPT

## 2022-05-19 PROCEDURE — 94060 EVALUATION OF WHEEZING: CPT

## 2022-05-19 RX ORDER — MONTELUKAST 10 MG/1
10 TABLET, FILM COATED ORAL
Qty: 90 | Refills: 1 | Status: ACTIVE | COMMUNITY
Start: 2022-05-19 | End: 1900-01-01

## 2022-05-20 PROBLEM — J45.909 ASTHMA: Status: ACTIVE | Noted: 2022-05-20

## 2022-05-20 PROBLEM — Z78.9 NEVER SMOKED TOBACCO: Status: ACTIVE | Noted: 2022-05-20

## 2022-05-20 PROBLEM — U09.9 POST COVID-19 CONDITION, UNSPECIFIED: Status: ACTIVE | Noted: 2022-05-20

## 2022-05-20 NOTE — DISCUSSION/SUMMARY
[FreeTextEntry1] : 22 yo male with hx of "asthma", post covid 19 infection with hypoxemia.I reviewed his inpatient chart and chest xray images, which were discussed with him.Presently he appears to be improving. He was given a two week sample of breztri with continued use of montelukast daily with PRN albuterol MDI. Treatment adjustment will depend on symptomatic needs. PFT will be performed in the future.A repeat chest xray will be performed. He is to follow up with his PMD as before.

## 2022-05-20 NOTE — HISTORY OF PRESENT ILLNESS
[Never] : never [Former] : former [TextBox_4] : 22 yo male with hx of "asthma" presents for evaluation. The patient states that he was diagnosed with asthma after covid 19 infection December 2021, at which time he was hospitalized for five days, treated with remdesivir and decadron, subsequently discharged home on supplemental oxygen, xarelto and albuterol MDI.He was seen by pulmonary, treated with montelukast. Presently he complains of dry cough with "tickle in throat". He denies fever, chest pain, hemoptysis or wheezing.He works as a . [TextBox_27] : quit 4 months ago [TextBox_29] : Denies snoring, daytime somnolence, apneic episodes, AM headaches

## 2022-06-26 PROBLEM — U07.1 COVID-19 VIRUS INFECTION: Status: RESOLVED | Noted: 2022-06-26 | Resolved: 2022-06-26

## 2022-06-26 PROBLEM — J98.4 RESTRICTIVE AIRWAY DISEASE: Status: ACTIVE | Noted: 2022-06-26

## 2022-06-26 PROBLEM — Z78.9 NON-SMOKER: Status: ACTIVE | Noted: 2022-06-26

## 2022-06-26 RX ORDER — ALBUTEROL SULFATE 90 UG/1
108 (90 BASE) INHALANT RESPIRATORY (INHALATION)
Refills: 0 | Status: ACTIVE | COMMUNITY
Start: 2022-06-26

## 2022-06-26 RX ORDER — MONTELUKAST 10 MG/1
10 TABLET, FILM COATED ORAL
Refills: 0 | Status: ACTIVE | COMMUNITY
Start: 2022-06-26

## 2022-06-26 NOTE — HISTORY OF PRESENT ILLNESS
[Former] : former [TextBox_4] : 20 yo male with hx of "asthma" ,post covid 19 infection, presents for follow up and PFT. The patient continues to complain of productive cough with PRN left sided chest pain. He denies fever, night sweats, weight loss or hemoptysis. He used breztri for two weeks without significant improvement. He continues to use montelukast daily with PRN albuterol MDI.

## 2022-06-26 NOTE — REVIEW OF SYSTEMS
[Cough] : cough [Chest Tightness] : chest tightness [Sputum] : sputum [Wheezing] : no wheezing [Negative] : Endocrine

## 2022-06-26 NOTE — DISCUSSION/SUMMARY
[FreeTextEntry1] : 22 yo male post covid 19 infection with hx of "asthma", at baseline. I reviewed the PFT results with the patient. The mild restriction may be effort relatedHe is to continue montelukast daily with breo 200 and PRN albuterol MDI. Treatment adjustment will depend on symptomatic needs.A chest xray will be performed.

## 2022-07-19 NOTE — ED PROVIDER NOTE - NEUROLOGICAL, MLM
7/18/2022    Urology Brief Note    No new  c/o    Visit Vitals  /65   Pulse 95   Temp 97.5 °F (36.4 °C)   Resp (!) 23   Ht 5' 5\" (1.651 m)   Wt 82.7 kg (182 lb 5.1 oz)   SpO2 97%   BMI 30.34 kg/m²     Abd/ soft SPT draining    WBC (K/mcL)   Date Value   07/18/2022 10.2     RBC (mil/mcL)   Date Value   07/18/2022 4.42 (L)     HCT (%)   Date Value   07/18/2022 43.1     HGB (g/dL)   Date Value   07/18/2022 12.6 (L)     PLT (K/mcL)   Date Value   07/18/2022 84 (L)     Sodium (mmol/L)   Date Value   07/18/2022 134 (L)     Potassium (mmol/L)   Date Value   07/18/2022 4.8     Chloride (mmol/L)   Date Value   07/18/2022 101     Glucose (mg/dL)   Date Value   07/18/2022 110 (H)     Calcium (mg/dL)   Date Value   07/18/2022 9.2     Carbon Dioxide (mmol/L)   Date Value   07/18/2022 27     BUN (mg/dL)   Date Value   07/18/2022 29 (H)     Creatinine (mg/dL)   Date Value   07/18/2022 3.74 (H)     Scrotal US  FINDINGS: The right testis is 3.1 x 2.4 x 1.8 cm and the left testis is 3 x  2.6 x 2.7 cm.  There are no testicular masses.  Both testes show normal  blood flow on color and pulse wave Doppler, including normal cranial venous  waveforms.       There is a large simple appearing left hydrocele.  No right hydrocele.      The epididymides are normal.  No varicoceles.     IMPRESSION:  1.  Normal testes  2.  Large left hydrocele.         ASSESSMENT:   Neurogenic bladder  ESRD  Chronic pyocystitis  I reviewed the US  The hydrocele does not need management at this time  The suprapubic tube was changed on 7/5/22  He is having same changed approximately monthly at his [facility]  Please call if any questions    Manoj Al MD     Alert and oriented, no focal deficits, no motor or sensory deficits.

## 2022-08-16 ENCOUNTER — APPOINTMENT (OUTPATIENT)
Dept: PULMONOLOGY | Facility: CLINIC | Age: 22
End: 2022-08-16

## 2022-09-06 ENCOUNTER — APPOINTMENT (OUTPATIENT)
Dept: PULMONOLOGY | Facility: CLINIC | Age: 22
End: 2022-09-06

## 2022-09-06 VITALS
WEIGHT: 228 LBS | SYSTOLIC BLOOD PRESSURE: 116 MMHG | OXYGEN SATURATION: 97 % | DIASTOLIC BLOOD PRESSURE: 74 MMHG | TEMPERATURE: 98 F | HEART RATE: 95 BPM

## 2022-09-06 DIAGNOSIS — R06.02 SHORTNESS OF BREATH: ICD-10-CM

## 2022-09-06 DIAGNOSIS — J43.9 EMPHYSEMA, UNSPECIFIED: ICD-10-CM

## 2022-09-06 DIAGNOSIS — J98.4 EMPHYSEMA, UNSPECIFIED: ICD-10-CM

## 2022-09-06 PROCEDURE — 99214 OFFICE O/P EST MOD 30 MIN: CPT | Mod: 25

## 2022-09-06 PROCEDURE — 94727 GAS DIL/WSHOT DETER LNG VOL: CPT

## 2022-09-06 PROCEDURE — 94060 EVALUATION OF WHEEZING: CPT

## 2022-09-06 PROCEDURE — 94729 DIFFUSING CAPACITY: CPT

## 2022-09-06 NOTE — PROCEDURE
[FreeTextEntry1] : PFT results: Mild mixed obstructive/restrictive disease. Mild bronchodilator response. Normal diffusion

## 2022-09-06 NOTE — HISTORY OF PRESENT ILLNESS
[Never] : never [Former] : former [TextBox_4] : 23 yo male with hx of "asthma" presents for follow up. The patient continues to complain of PRN productive cough, mostly at work             ([employer]). He uses breo 200 and montelukast daily with PRN albuterol MDI.  [TextBox_29] : Denies snoring, daytime somnolence, apneic episodes, AM headaches

## 2022-11-30 RX ORDER — ALBUTEROL SULFATE 90 UG/1
108 (90 BASE) INHALANT RESPIRATORY (INHALATION)
Qty: 1 | Refills: 3 | Status: ACTIVE | COMMUNITY
Start: 2022-05-19 | End: 1900-01-01

## 2022-11-30 RX ORDER — FLUTICASONE FUROATE AND VILANTEROL TRIFENATATE 200; 25 UG/1; UG/1
200-25 POWDER RESPIRATORY (INHALATION)
Qty: 1 | Refills: 3 | Status: ACTIVE | COMMUNITY
Start: 2022-05-19 | End: 1900-01-01

## 2023-01-03 ENCOUNTER — APPOINTMENT (OUTPATIENT)
Dept: PULMONOLOGY | Facility: CLINIC | Age: 23
End: 2023-01-03

## 2023-07-03 ENCOUNTER — EMERGENCY (EMERGENCY)
Facility: HOSPITAL | Age: 23
LOS: 1 days | Discharge: HOME | End: 2023-07-03
Attending: EMERGENCY MEDICINE
Payer: COMMERCIAL

## 2023-07-03 VITALS
OXYGEN SATURATION: 96 % | RESPIRATION RATE: 18 BRPM | HEIGHT: 67 IN | HEART RATE: 77 BPM | DIASTOLIC BLOOD PRESSURE: 81 MMHG | SYSTOLIC BLOOD PRESSURE: 127 MMHG | TEMPERATURE: 99 F | WEIGHT: 220.02 LBS

## 2023-07-03 PROCEDURE — 12001 RPR S/N/AX/GEN/TRNK 2.5CM/<: CPT

## 2023-07-03 PROCEDURE — 90471 IMMUNIZATION ADMIN: CPT

## 2023-07-03 PROCEDURE — 12041 INTMD RPR N-HF/GENIT 2.5CM/<: CPT

## 2023-07-03 PROCEDURE — 99283 EMERGENCY DEPT VISIT LOW MDM: CPT | Mod: 25

## 2023-07-03 PROCEDURE — 90715 TDAP VACCINE 7 YRS/> IM: CPT

## 2023-07-03 PROCEDURE — 99284 EMERGENCY DEPT VISIT MOD MDM: CPT | Mod: 25

## 2023-07-03 RX ORDER — IBUPROFEN 200 MG
1 TABLET ORAL
Qty: 20 | Refills: 0
Start: 2023-07-03 | End: 2023-07-07

## 2023-07-03 RX ORDER — TETANUS TOXOID, REDUCED DIPHTHERIA TOXOID AND ACELLULAR PERTUSSIS VACCINE, ADSORBED 5; 2.5; 8; 8; 2.5 [IU]/.5ML; [IU]/.5ML; UG/.5ML; UG/.5ML; UG/.5ML
0.5 SUSPENSION INTRAMUSCULAR ONCE
Refills: 0 | Status: COMPLETED | OUTPATIENT
Start: 2023-07-03 | End: 2023-07-03

## 2023-07-03 RX ADMIN — TETANUS TOXOID, REDUCED DIPHTHERIA TOXOID AND ACELLULAR PERTUSSIS VACCINE, ADSORBED 0.5 MILLILITER(S): 5; 2.5; 8; 8; 2.5 SUSPENSION INTRAMUSCULAR at 12:35

## 2023-07-03 RX ADMIN — Medication 1 TABLET(S): at 12:35

## 2023-07-03 NOTE — ED PROVIDER NOTE - CARE PLAN
1 Principal Discharge DX:	Dog bite  Secondary Diagnosis:	Laceration of skin of finger, initial encounter

## 2023-07-03 NOTE — ED PROCEDURE NOTE - PROCEDURE ADDITIONAL DETAILS
Digital block with lidocaine was done. Digital block with lidocaine was done. - loosely approximated

## 2023-07-03 NOTE — ED PROVIDER NOTE - NSFOLLOWUPINSTRUCTIONS_ED_ALL_ED_FT
bacitracin to area 2x/day, keep area dry/clean.  monitor for infection (redness/pus,worsening pain, loss of mobility with increase swelling).  ok to wash with running soap and water.  wound check in 3 days and suture will fall off

## 2023-07-03 NOTE — ED PROVIDER NOTE - OBJECTIVE STATEMENT
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 He was playing with the dog yesterday with a toy when the dog bit the toy and his left index finger accidentally around 10pm last night. Pt washed it and dressed it. He came today for evaluation. Last tetanus unknown. No loss of motility.

## 2023-07-03 NOTE — ED PROVIDER NOTE - PATIENT PORTAL LINK FT
You can access the FollowMyHealth Patient Portal offered by [facility] by registering at the following website: [email or web address]. By joining [employer]’s FollowMyHealth portal, you will also be able to view your health information using other applications (apps) compatible with our system.

## 2023-07-03 NOTE — ED PROVIDER NOTE - CLINICAL SUMMARY MEDICAL DECISION MAKING FREE TEXT BOX
22 year old male presenting for dog bite (personal dog and is vaccinated). No signs of infection. Will loosely approximate it to prevent further extension of the wound. Advise patient to monitor for infection and told patient that it is ok to wash with soap and water. Return precaution given if worsening symptoms. Will place sutures and discharge.

## 2023-09-18 ENCOUNTER — TRANSCRIPTION ENCOUNTER (OUTPATIENT)
Age: 23
End: 2023-09-18

## 2024-02-23 ENCOUNTER — APPOINTMENT (OUTPATIENT)
Dept: ORTHOPEDIC SURGERY | Facility: CLINIC | Age: 24
End: 2024-02-23